# Patient Record
Sex: FEMALE | Race: WHITE | NOT HISPANIC OR LATINO | Employment: UNEMPLOYED | URBAN - METROPOLITAN AREA
[De-identification: names, ages, dates, MRNs, and addresses within clinical notes are randomized per-mention and may not be internally consistent; named-entity substitution may affect disease eponyms.]

---

## 2018-04-09 ENCOUNTER — APPOINTMENT (OUTPATIENT)
Dept: CT IMAGING | Facility: CLINIC | Age: 58
End: 2018-04-09
Attending: INTERNAL MEDICINE
Payer: COMMERCIAL

## 2018-04-09 ENCOUNTER — HOSPITAL ENCOUNTER (EMERGENCY)
Facility: CLINIC | Age: 58
Discharge: HOME | End: 2018-04-09
Attending: INTERNAL MEDICINE | Admitting: INTERNAL MEDICINE
Payer: COMMERCIAL

## 2018-04-09 VITALS
SYSTOLIC BLOOD PRESSURE: 122 MMHG | OXYGEN SATURATION: 94 % | HEART RATE: 96 BPM | HEIGHT: 65 IN | TEMPERATURE: 97.6 F | DIASTOLIC BLOOD PRESSURE: 67 MMHG | BODY MASS INDEX: 27.32 KG/M2 | RESPIRATION RATE: 18 BRPM | WEIGHT: 164 LBS

## 2018-04-09 DIAGNOSIS — R79.89 PRERENAL AZOTEMIA: ICD-10-CM

## 2018-04-09 DIAGNOSIS — I95.1 ORTHOSTATIC HYPOTENSION: ICD-10-CM

## 2018-04-09 LAB
ALBUMIN SERPL-MCNC: 3.5 G/DL (ref 3.4–5)
ALBUMIN UR-MCNC: NEGATIVE MG/DL
ALP SERPL-CCNC: 391 U/L (ref 40–150)
ALT SERPL W P-5'-P-CCNC: 105 U/L (ref 0–50)
ANION GAP SERPL CALCULATED.3IONS-SCNC: 11 MMOL/L (ref 3–14)
APPEARANCE UR: CLEAR
AST SERPL W P-5'-P-CCNC: 62 U/L (ref 0–45)
BACTERIA #/AREA URNS HPF: ABNORMAL /HPF
BASOPHILS # BLD AUTO: 0.1 10E9/L (ref 0–0.2)
BASOPHILS NFR BLD AUTO: 1.1 %
BILIRUB SERPL-MCNC: 1 MG/DL (ref 0.2–1.3)
BILIRUB UR QL STRIP: NEGATIVE
BUN SERPL-MCNC: 35 MG/DL (ref 7–30)
CALCIUM SERPL-MCNC: 9.1 MG/DL (ref 8.5–10.1)
CHLORIDE SERPL-SCNC: 90 MMOL/L (ref 94–109)
CO2 SERPL-SCNC: 24 MMOL/L (ref 20–32)
COLOR UR AUTO: ABNORMAL
CREAT BLD-MCNC: 2.2 MG/DL (ref 0.52–1.04)
CREAT SERPL-MCNC: 2 MG/DL (ref 0.52–1.04)
DIFFERENTIAL METHOD BLD: ABNORMAL
EOSINOPHIL # BLD AUTO: 0.2 10E9/L (ref 0–0.7)
EOSINOPHIL NFR BLD AUTO: 3.2 %
ERYTHROCYTE [DISTWIDTH] IN BLOOD BY AUTOMATED COUNT: 12.9 % (ref 10–15)
GFR SERPL CREATININE-BSD FRML MDRD: 23 ML/MIN/1.7M2
GFR SERPL CREATININE-BSD FRML MDRD: 25 ML/MIN/1.7M2
GLUCOSE SERPL-MCNC: 105 MG/DL (ref 70–99)
GLUCOSE UR STRIP-MCNC: NEGATIVE MG/DL
HCT VFR BLD AUTO: 29.9 % (ref 35–47)
HGB BLD-MCNC: 9.9 G/DL (ref 11.7–15.7)
HGB UR QL STRIP: NEGATIVE
IMM GRANULOCYTES # BLD: 0.1 10E9/L (ref 0–0.4)
IMM GRANULOCYTES NFR BLD: 1.5 %
INTERPRETATION ECG - MUSE: NORMAL
KETONES UR STRIP-MCNC: NEGATIVE MG/DL
LACTATE BLD-SCNC: 0.9 MMOL/L (ref 0.7–2)
LEUKOCYTE ESTERASE UR QL STRIP: NEGATIVE
LYMPHOCYTES # BLD AUTO: 1 10E9/L (ref 0.8–5.3)
LYMPHOCYTES NFR BLD AUTO: 12.8 %
MCH RBC QN AUTO: 29.9 PG (ref 26.5–33)
MCHC RBC AUTO-ENTMCNC: 33.1 G/DL (ref 31.5–36.5)
MCV RBC AUTO: 90 FL (ref 78–100)
MONOCYTES # BLD AUTO: 0.8 10E9/L (ref 0–1.3)
MONOCYTES NFR BLD AUTO: 10.3 %
NEUTROPHILS # BLD AUTO: 5.4 10E9/L (ref 1.6–8.3)
NEUTROPHILS NFR BLD AUTO: 71.1 %
NITRATE UR QL: NEGATIVE
NRBC # BLD AUTO: 0 10*3/UL
NRBC BLD AUTO-RTO: 0 /100
PH UR STRIP: 6 PH (ref 5–7)
PLATELET # BLD AUTO: 610 10E9/L (ref 150–450)
POTASSIUM SERPL-SCNC: 4.4 MMOL/L (ref 3.4–5.3)
PROT SERPL-MCNC: 7.7 G/DL (ref 6.8–8.8)
RBC # BLD AUTO: 3.31 10E12/L (ref 3.8–5.2)
RBC #/AREA URNS AUTO: <1 /HPF (ref 0–2)
SODIUM SERPL-SCNC: 125 MMOL/L (ref 133–144)
SOURCE: ABNORMAL
SP GR UR STRIP: 1 (ref 1–1.03)
SQUAMOUS #/AREA URNS AUTO: 1 /HPF (ref 0–1)
TROPONIN I SERPL-MCNC: <0.015 UG/L (ref 0–0.04)
UROBILINOGEN UR STRIP-MCNC: 0 MG/DL (ref 0–2)
WBC # BLD AUTO: 7.6 10E9/L (ref 4–11)
WBC #/AREA URNS AUTO: <1 /HPF (ref 0–5)

## 2018-04-09 PROCEDURE — 96361 HYDRATE IV INFUSION ADD-ON: CPT

## 2018-04-09 PROCEDURE — 36415 COLL VENOUS BLD VENIPUNCTURE: CPT | Performed by: INTERNAL MEDICINE

## 2018-04-09 PROCEDURE — 96360 HYDRATION IV INFUSION INIT: CPT

## 2018-04-09 PROCEDURE — 80053 COMPREHEN METABOLIC PANEL: CPT | Performed by: INTERNAL MEDICINE

## 2018-04-09 PROCEDURE — 84484 ASSAY OF TROPONIN QUANT: CPT | Performed by: INTERNAL MEDICINE

## 2018-04-09 PROCEDURE — 81001 URINALYSIS AUTO W/SCOPE: CPT | Performed by: INTERNAL MEDICINE

## 2018-04-09 PROCEDURE — 25000128 H RX IP 250 OP 636: Performed by: INTERNAL MEDICINE

## 2018-04-09 PROCEDURE — 74176 CT ABD & PELVIS W/O CONTRAST: CPT

## 2018-04-09 PROCEDURE — 93005 ELECTROCARDIOGRAM TRACING: CPT

## 2018-04-09 PROCEDURE — 85025 COMPLETE CBC W/AUTO DIFF WBC: CPT | Performed by: INTERNAL MEDICINE

## 2018-04-09 PROCEDURE — 83605 ASSAY OF LACTIC ACID: CPT | Performed by: INTERNAL MEDICINE

## 2018-04-09 PROCEDURE — 82565 ASSAY OF CREATININE: CPT | Mod: 91

## 2018-04-09 PROCEDURE — 99285 EMERGENCY DEPT VISIT HI MDM: CPT | Mod: 25

## 2018-04-09 RX ORDER — POLYETHYLENE GLYCOL 3350 17 G/17G
1 POWDER, FOR SOLUTION ORAL DAILY
COMMUNITY
End: 2020-10-07

## 2018-04-09 RX ORDER — SODIUM CHLORIDE 9 MG/ML
1000 INJECTION, SOLUTION INTRAVENOUS CONTINUOUS
Status: DISCONTINUED | OUTPATIENT
Start: 2018-04-09 | End: 2018-04-09

## 2018-04-09 RX ADMIN — SODIUM CHLORIDE 1000 ML: 9 INJECTION, SOLUTION INTRAVENOUS at 11:40

## 2018-04-09 ASSESSMENT — ENCOUNTER SYMPTOMS
DIAPHORESIS: 1
ABDOMINAL PAIN: 0
FEVER: 0
APPETITE CHANGE: 1
COUGH: 0
DECREASED CONCENTRATION: 1
WEAKNESS: 1

## 2018-04-09 NOTE — ED NOTES
Patient presents to ER for generalized weakness with syncope/fainting starting Thursday. Patient just discharged from [facility] on Wednesday after bypass surgery on March 21st. ABC's intact.

## 2018-04-09 NOTE — DISCHARGE INSTRUCTIONS
Start eating, even if you have to force yourself  For now, do not take hydrochlorothiazide, and do not take Norvasc (amlodipine)  See Dr. Goel on THursday. Call her office for a time    Discharge Instructions for Low Blood Pressure (Hypotension)  You have been diagnosed with hypotension. When you have hypotension, your blood pressure is lower than normal. Low blood pressure can make you feel dizzy or faint. This condition is sometimes a side effect of taking certain medicines, including medicines for high blood pressure (hypertension). It can also result from medical conditions such as dehydration.  Home care  These home care steps can help manage your condition:    Follow your doctor s instructions.    Rest in bed and ask for help with daily activities until you feel better. You may need to slowly increase the amount of time you spend sitting or doing light activity.    Don t drive while your blood pressure is not controlled.    Be careful when you get up from sitting or lying down.    Take your time. Sudden movements can cause dizziness or fainting.    When you first sit up after lying down, be sure to sit in bed for 30 seconds or so before getting up to walk.    Tell your doctor about the medicines you are taking. Many kinds of medicines trigger low blood pressure.    Limit your alcohol intake to no more than 2 drinks a day for men and 1 drink a day for women. Alcohol can dehydrate you even further. It can also interfere with the effectiveness of medicines.    Prevent dehydration by drinking plenty of fluids, unless otherwise instructed by your doctor    Learn to take your own blood pressure. Keep a record of your results. Ask your doctor which readings mean that you need medical attention.    Tell your family members to call an ambulance if you become unconscious. Request that they learn CPR.  Follow-up care  Make a follow-up appointment as directed by our staff.     When to seek medical care  Call your  doctor immediately if you have any of the following:    Chest pain or shortness of breath (call 911)    Dizziness or fainting spells    Black, maroon, or tarry stools    Irregular heartbeat    Stiff neck    Severe upper back pain    Diarrhea or vomiting that doesn t go away    Inability to eat or drink    Burning sensation when you urinate    Urine with a strong, unpleasant odor    Fainting with exercise   Date Last Reviewed: 4/27/2016 2000-2017 The "[employer]. [address] [place] [address]. All rights reserved. This information is not intended as a substitute for professional medical care. Always follow your healthcare professional's instructions.

## 2018-04-09 NOTE — ED AVS SNAPSHOT
[facility] Emergency Department    [address]    [facility] [address]    Phone:  [phone]    Fax:  [phone]                                       Isa Eller   MRN: [id number]    Department:  [facility] Emergency Department   Date of Visit:  4/9/2018           Patient Information     Date Of Birth          1960        Your diagnoses for this visit were:     Orthostatic hypotension     Prerenal azotemia        You were seen by Nyla Levi MD.      Follow-up Information     Follow up with Anibal Goel In 3 days.    Contact information:    [facility]  [address] [facility] [id number] [phone]          Discharge Instructions         Start eating, even if you have to force yourself  For now, do not take hydrochlorothiazide, and do not take Norvasc (amlodipine)  See Dr. Goel on THursday. Call her office for a time    Discharge Instructions for Low Blood Pressure (Hypotension)  You have been diagnosed with hypotension. When you have hypotension, your blood pressure is lower than normal. Low blood pressure can make you feel dizzy or faint. This condition is sometimes a side effect of taking certain medicines, including medicines for high blood pressure (hypertension). It can also result from medical conditions such as dehydration.  Home care  These home care steps can help manage your condition:    Follow your doctor s instructions.    Rest in bed and ask for help with daily activities until you feel better. You may need to slowly increase the amount of time you spend sitting or doing light activity.    Don t drive while your blood pressure is not controlled.    Be careful when you get up from sitting or lying down.    Take your time. Sudden movements can cause dizziness or fainting.    When you first sit up after lying down, be sure to sit in bed for 30 seconds or so before getting up to walk.    Tell your doctor about the  medicines you are taking. Many kinds of medicines trigger low blood pressure.    Limit your alcohol intake to no more than 2 drinks a day for men and 1 drink a day for women. Alcohol can dehydrate you even further. It can also interfere with the effectiveness of medicines.    Prevent dehydration by drinking plenty of fluids, unless otherwise instructed by your doctor    Learn to take your own blood pressure. Keep a record of your results. Ask your doctor which readings mean that you need medical attention.    Tell your family members to call an ambulance if you become unconscious. Request that they learn CPR.  Follow-up care  Make a follow-up appointment as directed by our staff.     When to seek medical care  Call your doctor immediately if you have any of the following:    Chest pain or shortness of breath (call 911)    Dizziness or fainting spells    Black, maroon, or tarry stools    Irregular heartbeat    Stiff neck    Severe upper back pain    Diarrhea or vomiting that doesn t go away    Inability to eat or drink    Burning sensation when you urinate    Urine with a strong, unpleasant odor    Fainting with exercise   Date Last Reviewed: 4/27/2016 2000-2017 [employer]. [address] [place] [address]. All rights reserved. This information is not intended as a substitute for professional medical care. Always follow your healthcare professional's instructions.          24 Hour Appointment Hotline       To make an appointment at any [facility], call [phone] ([phone]). If you don't have a family doctor or clinic, we will help you find one. [facility] are conveniently located to serve the needs of you and your family.             Review of your medicines      Our records show that you are taking the medicines listed below. If these are incorrect, please call your family doctor or clinic.        Dose / Directions Last dose taken    AMLODIPINE BESYLATE PO   Dose:  10  mg        Take 10 mg by mouth daily   Refills:  0        ASPIRIN EC PO   Dose:  81 mg        Take 81 mg by mouth daily   Refills:  0        calcium carbonate 500 MG chewable tablet   Commonly known as:  TUMS   Dose:  3 chew tab        Take 3 chew tab by mouth 2 times daily as needed for heartburn   Refills:  0        clonazePAM 1 MG tablet   Commonly known as:  klonoPIN   Dose:  1 mg        Take 1 mg by mouth daily as needed for anxiety   Refills:  0        CYCLOBENZAPRINE HCL PO   Dose:  10 mg        Take 10 mg by mouth   Refills:  0        HYDROCHLOROTHIAZIDE PO   Dose:  12.5 mg        Take 12.5 mg by mouth daily   Refills:  0        LIPITOR 80 MG tablet   Dose:  80 mg   Generic drug:  atorvastatin        Take 80 mg by mouth daily   Refills:  0        LISINOPRIL PO   Dose:  40 mg        Take 40 mg by mouth   Refills:  0        omeprazole 40 MG capsule   Commonly known as:  priLOSEC   Dose:  40 mg   Quantity:  90 capsule        Take 1 capsule (40 mg) by mouth daily Take 30-60 minutes before a meal.   Refills:  1        polyethylene glycol Packet   Commonly known as:  MIRALAX/GLYCOLAX   Dose:  1 packet        Take 1 packet by mouth daily   Refills:  0        TOPROL XL PO   Dose:  100 mg        Take 100 mg by mouth daily   Refills:  0        VITAMIN D (CHOLECALCIFEROL) PO   Dose:  58973 Units        Take 50,000 Units by mouth twice a week On Sunday and Wednesday.   Refills:  0        ZOFRAN PO   Dose:  4 mg        Take 4 mg by mouth   Refills:  0                Procedures and tests performed during your visit     CBC with platelets differential    CT Abdomen Pelvis w/o Contrast    Comprehensive metabolic panel    Creatinine POCT    EKG 12-lead, tracing only    Lactic acid whole blood    Troponin I    UA with Microscopic reflex to Culture      Orders Needing Specimen Collection     None      Pending Results     No orders found from 4/7/2018 to 4/10/2018.            Pending Culture Results     No orders found from  4/7/2018 to 4/10/2018.            Pending Results Instructions     If you had any lab results that were not finalized at the time of your Discharge, you can call the ED Lab Result RN at [phone]. You will be contacted by this team for any positive Lab results or changes in treatment. The nurses are available 7 days a week from 10A to 6:30P.  You can leave a message 24 hours per day and they will return your call.        Test Results From Your Hospital Stay        4/9/2018 12:19 PM      Component Results     Component Value Ref Range & Units Status    WBC 7.6 4.0 - 11.0 10e9/L Final    RBC Count 3.31 (L) 3.8 - 5.2 10e12/L Final    Hemoglobin 9.9 (L) 11.7 - 15.7 g/dL Final    Hematocrit 29.9 (L) 35.0 - 47.0 % Final    MCV 90 78 - 100 fl Final    MCH 29.9 26.5 - 33.0 pg Final    MCHC 33.1 31.5 - 36.5 g/dL Final    RDW 12.9 10.0 - 15.0 % Final    Platelet Count 610 (H) 150 - 450 10e9/L Final    Diff Method Automated Method  Final    % Neutrophils 71.1 % Final    % Lymphocytes 12.8 % Final    % Monocytes 10.3 % Final    % Eosinophils 3.2 % Final    % Basophils 1.1 % Final    % Immature Granulocytes 1.5 % Final    Nucleated RBCs 0 0 /100 Final    Absolute Neutrophil 5.4 1.6 - 8.3 10e9/L Final    Absolute Lymphocytes 1.0 0.8 - 5.3 10e9/L Final    Absolute Monocytes 0.8 0.0 - 1.3 10e9/L Final    Absolute Eosinophils 0.2 0.0 - 0.7 10e9/L Final    Absolute Basophils 0.1 0.0 - 0.2 10e9/L Final    Abs Immature Granulocytes 0.1 0 - 0.4 10e9/L Final    Absolute Nucleated RBC 0.0  Final         4/9/2018 12:48 PM      Component Results     Component Value Ref Range & Units Status    Sodium 125 (L) 133 - 144 mmol/L Final    Potassium 4.4 3.4 - 5.3 mmol/L Final    Chloride 90 (L) 94 - 109 mmol/L Final    Carbon Dioxide 24 20 - 32 mmol/L Final    Anion Gap 11 3 - 14 mmol/L Final    Glucose 105 (H) 70 - 99 mg/dL Final    Urea Nitrogen 35 (H) 7 - 30 mg/dL Final    Creatinine 2.00 (H) 0.52 - 1.04 mg/dL Final    GFR Estimate 25 (L)  >60 mL/min/1.7m2 Final    Non  GFR Calc    GFR Estimate If Black 31 (L) >60 mL/min/1.7m2 Final    African American GFR Calc    Calcium 9.1 8.5 - 10.1 mg/dL Final    Bilirubin Total 1.0 0.2 - 1.3 mg/dL Final    Albumin 3.5 3.4 - 5.0 g/dL Final    Protein Total 7.7 6.8 - 8.8 g/dL Final    Alkaline Phosphatase 391 (H) 40 - 150 U/L Final     (H) 0 - 50 U/L Final    AST 62 (H) 0 - 45 U/L Final         4/9/2018 12:48 PM      Component Results     Component Value Ref Range & Units Status    Troponin I ES <0.015 0.000 - 0.045 ug/L Final    The 99th percentile for upper reference range is 0.045 ug/L.  Troponin values   in the range of 0.045 - 0.120 ug/L may be associated with risks of adverse   clinical events.           4/9/2018  1:46 PM      Component Results     Component Value Ref Range & Units Status    Color Urine Straw  Final    Appearance Urine Clear  Final    Glucose Urine Negative NEG^Negative mg/dL Final    Bilirubin Urine Negative NEG^Negative Final    Ketones Urine Negative NEG^Negative mg/dL Final    Specific Gravity Urine 1.004 1.003 - 1.035 Final    Blood Urine Negative NEG^Negative Final    pH Urine 6.0 5.0 - 7.0 pH Final    Protein Albumin Urine Negative NEG^Negative mg/dL Final    Urobilinogen mg/dL 0.0 0.0 - 2.0 mg/dL Final    Nitrite Urine Negative NEG^Negative Final    Leukocyte Esterase Urine Negative NEG^Negative Final    Source Midstream Urine  Final    WBC Urine <1 0 - 5 /HPF Final    RBC Urine <1 0 - 2 /HPF Final    Bacteria Urine Few (A) NEG^Negative /HPF Final    Squamous Epithelial /HPF Urine 1 0 - 1 /HPF Final         4/9/2018 11:49 AM      Component Results     Component Value Ref Range & Units Status    Creatinine 2.2 (H) 0.52 - 1.04 mg/dL Final    GFR Estimate 23 (L) >60 mL/min/1.7m2 Final    GFR Estimate If Black 28 (L) >60 mL/min/1.7m2 Final         4/9/2018 12:54 PM      Narrative     CT ABDOMEN/PELVIS WITHOUT CONTRAST April 9, 2018 12:26 PM     HISTORY: Recent  aorto-bifemoral bypass, now hypotension and abdomen  tenderness.      TECHNIQUE: Axial images are obtained from the lung bases to the  symphysis without oral or IV contrast. Coronal reformatted images are  also generated.  Radiation dose for this scan was reduced using  automated exposure control, adjustment of the mA and/or kV according  to patient size, or iterative reconstruction technique.    FINDINGS: Mild dependent atelectasis is present at the lung bases.  Lung bases are otherwise clear.    Abdomen: Allowing for the noncontrast technique, the liver, spleen,  pancreas and adrenal glands are unremarkable. No urinary tract calculi  or hydronephrosis. Prior cholecystectomy. Patient is status post  aortobifemoral graft placement. Aortobiiliac graft is present.  Previously noted distal aortic and common iliac stents are also noted.  Appendix is normal.    Pelvis: Calcified uterine fibroids are present. The bladder and rectum  are unremarkable. No enlarged pelvic lymph nodes or free fluid. Bone  window examination is unremarkable.        Impression     IMPRESSION:  1. Status post aortobiiliac graft placement. Postoperative  inflammation is noted in the retroperitoneum but no evidence of  retroperitoneal hemorrhage or lymph node enlargement.  2. No evidence of bowel obstruction or diverticulitis. Appendix is  normal. No evidence of retroperitoneal hemorrhage. Scattered calcified  plaque is noted in the upper abdominal aorta. No enlarged abdominal  lymph nodes.      SAMMY KIM MD         4/9/2018  1:31 PM      Component Results     Component Value Ref Range & Units Status    Lactic Acid 0.9 0.7 - 2.0 mmol/L Final                Clinical Quality Measure: Blood Pressure Screening     Your blood pressure was checked while you were in the emergency department today. The last reading we obtained was  BP: 115/56 . Please read the guidelines below about what these numbers mean and what you should do about them.  If your  "systolic blood pressure (the top number) is less than 120 and your diastolic blood pressure (the bottom number) is less than 80, then your blood pressure is normal. There is nothing more that you need to do about it.  If your systolic blood pressure (the top number) is 120-139 or your diastolic blood pressure (the bottom number) is 80-89, your blood pressure may be higher than it should be. You should have your blood pressure rechecked within a year by a primary care provider.  If your systolic blood pressure (the top number) is 140 or greater or your diastolic blood pressure (the bottom number) is 90 or greater, you may have high blood pressure. High blood pressure is treatable, but if left untreated over time it can put you at risk for heart attack, stroke, or kidney failure. You should have your blood pressure rechecked by a primary care provider within the next 4 weeks.  If your provider in the emergency department today gave you specific instructions to follow-up with your doctor or provider even sooner than that, you should follow that instruction and not wait for up to 4 weeks for your follow-up visit.        Thank you for choosing [facility]       Thank you for choosing [facility] for your care. Our goal is always to provide you with excellent care. Hearing back from our patients is one way we can continue to improve our services. Please take a few minutes to complete the written survey that you may receive in the mail after you visit with us. Thank you!        [employer] Information     [employer] lets you send messages to your doctor, view your test results, renew your prescriptions, schedule appointments and more. To sign up, go to [email or web address] . Click on \"Log in\" on the left side of the screen, which will take you to the Welcome page. Then click on \"Sign up Now\" on the right side of the page.     You will be asked to enter the access code listed below, as well as some personal information. Please " follow the directions to create your username and password.     Your access code is: [email or web address]  Expires: 2018  3:04 PM     Your access code will  in 90 days. If you need help or a new code, please call your [facility] clinic or [phone].        Care EveryWhere ID     This is your Care EveryWhere ID. This could be used by other organizations to access your [facility] medical records  [id number]        Equal Access to Services     IVANIA VAUGHN : Hadii demi steward hadasho Soomaali, waaxda luqadaha, qaybta kaalmada adeegyajo, franco hardin . So [facility] [phone].    ATENCIÓN: Si habla español, tiene a santos disposición servicios gratuitos de asistencia lingüística. Llame al [phone].    We comply with applicable federal civil rights laws and [place] laws. We do not discriminate on the basis of race, color, national origin, age, disability, sex, sexual orientation, or gender identity.            After Visit Summary       This is your record. Keep this with you and show to your community pharmacist(s) and doctor(s) at your next visit.

## 2018-04-09 NOTE — ED PROVIDER NOTES
History     Chief Complaint:  Generalized Weakness    HPI   Isa Eller is a 57 year old female, with a history of PAD, who presents with her sisters to the ED for evaluation of generalized weakness. The patient reports she had an aorta bifemoral bypass surgery on 3/21/18 at [facility]. She felt well to be discharged and was discharged on 4/4/18. The patient notes she felt weak the next day and has had near syncopal episodes. She has diaphoresis, blurry vision, and has been falling into walls. The patient's sister reports she has had difficulty concentrating since discharge as well. She did have nausea during her hospital stay and after discharge which has resolved with anti-nausea medication 3 days ago. The patient reports she has no appetite. She has been hydrating. The patient denies any loss of consciousness, fevers, cough, cold symptoms, chest pain, or abdominal pain.     Laboratory, 4/2/2018  Creatinine: 0.65  HGB: 8.9     Allergies:  No known drug allergies    Medications:    Amlodipine besylate  Cyclobenzaprine  Hydrochlorothiazide  Lisinopril   Zofran  Miralax/Glycolax  Prilosec  Clonazepam  Vitamin D  Aspirin  Metoprolol succinate  Lipitor  Tums     Past Medical History:    Acalculous cholecystitis  Anxiety  Clotting disorder  HTN  HLD  PAD     Past Surgical History:    Breast surgery  Aortic bifemoral bypass  Cholecystectomy    Family History:    History reviewed. No pertinent family history.     Social History:  Smoking status: Current every day smoker  Alcohol use: Yes  Presents to ED with sisters   Marital Status:  Single [1]     Review of Systems   Constitutional: Positive for appetite change and diaphoresis. Negative for fever.   Eyes: Positive for visual disturbance.   Respiratory: Negative for cough.    Cardiovascular: Negative for chest pain.   Gastrointestinal: Negative for abdominal pain.   Neurological: Positive for weakness. Negative for syncope.   Psychiatric/Behavioral: Positive for  "decreased concentration.   All other systems reviewed and are negative.    Physical Exam     Patient Vitals for the past 24 hrs:   BP Temp Temp src Pulse Heart Rate Resp SpO2 Height Weight   04/09/18 1500 122/67 - - - 89 18 94 % - -   04/09/18 1445 115/56 - - - 85 18 92 % - -   04/09/18 1430 121/63 - - - 86 15 92 % - -   04/09/18 1415 113/59 - - - 85 15 92 % - -   04/09/18 1400 113/60 - - - 90 15 96 % - -   04/09/18 1345 116/73 - - - 86 18 96 % - -   04/09/18 1300 113/67 - - - 82 25 94 % - -   04/09/18 1245 112/62 - - - - - - - -   04/09/18 1200 111/58 - - - 84 12 95 % - -   04/09/18 1145 113/53 - - - 87 19 93 % - -   04/09/18 1128 - 97.6  F (36.4  C) Oral - - - - - -   04/09/18 1119 99/60 - - 96 - 22 95 % 1.651 m (5' 5\") 74.4 kg (164 lb)     Physical Exam   Constitutional: She is cooperative.   HENT:   Right Ear: Tympanic membrane normal.   Left Ear: Tympanic membrane normal.   Mouth/Throat: Oropharynx is clear and moist and mucous membranes are normal.   Eyes: Conjunctivae are normal.   Neck: Normal range of motion.   Cardiovascular: Regular rhythm and normal heart sounds.    Pulmonary/Chest: Effort normal and breath sounds normal.   Abdominal: Soft. Normal appearance and bowel sounds are normal. There is tenderness.   Well-healed recent midline incision   Musculoskeletal: Normal range of motion.   Lymphadenopathy:     She has no cervical adenopathy.   Neurological: She is alert.   Skin: Skin is warm and dry. Bruising noted.   Multiple bruises, patient explains due to falls   Psychiatric: She has a normal mood and affect.     Emergency Department Course     ECG (11:43:03):  Rate 89 bpm. CA interval 164. QRS duration 80. QT/QTc 378/459. P-R-T axes 29 26 32. Normal sinus rhythm. Nonspecific ST abnormality. Abnormal ECG. Compared with 7/20/16, ST/T wave abnormalities have improved or resolved. Interpreted at 1225 by Nyla Levi MD.    Imaging:  Radiographic findings were communicated with the patient and " family who voiced understanding of the findings.    CT Abdomen Pelvis w/o Contrast  IMPRESSION:  1. Status post aortobiiliac graft placement. Postoperative  inflammation is noted in the retroperitoneum but no evidence of  retroperitoneal hemorrhage or lymph node enlargement.  2. No evidence of bowel obstruction or diverticulitis. Appendix is  normal. No evidence of retroperitoneal hemorrhage. Scattered calcified  plaque is noted in the upper abdominal aorta. No enlarged abdominal  lymph nodes.  As read by Radiology.     Laboratory:  Creatinine POCT: Creatinine 2.2(H), GFR estimate 23(L)  Troponin I (1135): <0.015  UA: Bacteria few   Lactic acid (1331): 0.9  CBC: HGB 9.9(L), (H), o/w WNL (WBC 7.6)  CMP: (L), Chloride 90(L), Glucose 105(H), BUN 35(H), GFR estimate 25(L), Alkphos 391(H), Creatinine 2.00(H)    Interventions:  1140: NS 1L Bolus IV    Emergency Department Course:  Past medical records, nursing notes, and vitals reviewed.  1119: I performed an exam of the patient and obtained history, as documented above.    IV inserted and blood drawn.    The patient was sent for an abdomen pelvis CT while in the emergency department, findings above.    1305: I rechecked the patient. Explained findings to patient and sisters.    1340: I spoke to Dr. Cash of the hospitalist service.    1420: I rechecked the patient.    1439: I consulted Dr. Goel, patient's vascular surgeon.     1456: I rechecked the patient. Explained plan to patient and sisters.    Findings and plan explained to the Patient and sisters. Patient discharged home with instructions regarding supportive care, medications, and reasons to return. The importance of close follow-up was reviewed.     Impression & Plan      Medical Decision Making:  Isa Eller, 57 year old woman with recent aortobifemoral bypass presents now with recurrent lightheadedness and near syncope. Her symptoms have not really progressed over recent days. Here, she did  have significant orthostatic vital sign changes on arrival. With IV fluid, she's now able to stand without symptoms and orthostatic vital sign changes have resolved. EKG does not show any acute abnormality. Hemoglobin is low but improved from discharge at [facility]. She does have a significantly elevated creatinine here which is new since discharge. With her history, BUN and creatinine ratio, and orthostatic vital sign changes, I suspect prerenal azotemia. She does have mild hyponatremia. I discussed the case with Dr. Goel, the patient's vascular surgeon. The patient herself prefers discharge home. Given her evidence of prerenal azotemia, we will stop Hydrochlorothiazide as well as Norvasc at least for now. Dr. Goel has offered to see the patient in close follow-up. I will have her push fluids. She indicates that she really hasn't eaten since before her surgery. She agrees that she will eat even though she's not hungry. She has only just started moving her bowels which may help her feel better. I've invited the patient to return for recurrent symptoms or other problems, follow-up in 3 days with Dr. Goel.     Diagnosis:    ICD-10-CM   1. Orthostatic hypotension I95.1   2. Prerenal azotemia R79.89     Disposition: Patient discharged to home with sisters     Destiny Goel  4/9/2018   [facility] EMERGENCY DEPARTMENT    I, Destiny Goel, am serving as a scribe at 11:19 AM on 4/9/2018 to document services personally performed by Nyla Levi MD based on my observations and the provider's statements to me.        Nyla Levi MD  04/09/18 [id number]

## 2018-04-09 NOTE — ED AVS SNAPSHOT
[facility] Emergency Department    [address]    [facility] [address]    Phone:  [phone]    Fax:  [phone]                                       Isa Eller   MRN: [id number]    Department:  [facility] Emergency Department   Date of Visit:  4/9/2018           After Visit Summary Signature Page     I have received my discharge instructions, and my questions have been answered. I have discussed any challenges I see with this plan with the nurse or doctor.    ..........................................................................................................................................  Patient/Patient Representative Signature      ..........................................................................................................................................  Patient Representative Print Name and Relationship to Patient    ..................................................               ................................................  Date                                            Time    ..........................................................................................................................................  Reviewed by Signature/Title    ...................................................              ..............................................  Date                                                            Time

## 2018-06-22 ENCOUNTER — HOSPITAL ENCOUNTER (EMERGENCY)
Facility: CLINIC | Age: 58
Discharge: HOME | End: 2018-06-23
Attending: EMERGENCY MEDICINE | Admitting: EMERGENCY MEDICINE
Payer: COMMERCIAL

## 2018-06-22 DIAGNOSIS — T50.901A ACCIDENTAL DRUG INGESTION, INITIAL ENCOUNTER: ICD-10-CM

## 2018-06-22 LAB
ANION GAP SERPL CALCULATED.3IONS-SCNC: 12 MMOL/L (ref 3–14)
APAP SERPL-MCNC: <2 MG/L (ref 10–20)
BASOPHILS # BLD AUTO: 0 10E9/L (ref 0–0.2)
BASOPHILS NFR BLD AUTO: 0.5 %
BUN SERPL-MCNC: 9 MG/DL (ref 7–30)
CALCIUM SERPL-MCNC: 8.3 MG/DL (ref 8.5–10.1)
CHLORIDE SERPL-SCNC: 100 MMOL/L (ref 94–109)
CO2 SERPL-SCNC: 22 MMOL/L (ref 20–32)
CREAT SERPL-MCNC: 0.81 MG/DL (ref 0.52–1.04)
DIFFERENTIAL METHOD BLD: NORMAL
EOSINOPHIL # BLD AUTO: 0 10E9/L (ref 0–0.7)
EOSINOPHIL NFR BLD AUTO: 0.2 %
ERYTHROCYTE [DISTWIDTH] IN BLOOD BY AUTOMATED COUNT: 13.2 % (ref 10–15)
ETHANOL SERPL-MCNC: 0.15 G/DL
GFR SERPL CREATININE-BSD FRML MDRD: 72 ML/MIN/1.7M2
GLUCOSE SERPL-MCNC: 111 MG/DL (ref 70–99)
HCT VFR BLD AUTO: 36.2 % (ref 35–47)
HGB BLD-MCNC: 12.1 G/DL (ref 11.7–15.7)
IMM GRANULOCYTES # BLD: 0 10E9/L (ref 0–0.4)
IMM GRANULOCYTES NFR BLD: 0.7 %
LYMPHOCYTES # BLD AUTO: 1 10E9/L (ref 0.8–5.3)
LYMPHOCYTES NFR BLD AUTO: 17.8 %
MCH RBC QN AUTO: 28.9 PG (ref 26.5–33)
MCHC RBC AUTO-ENTMCNC: 33.4 G/DL (ref 31.5–36.5)
MCV RBC AUTO: 86 FL (ref 78–100)
MONOCYTES # BLD AUTO: 0.5 10E9/L (ref 0–1.3)
MONOCYTES NFR BLD AUTO: 9.2 %
NEUTROPHILS # BLD AUTO: 4.1 10E9/L (ref 1.6–8.3)
NEUTROPHILS NFR BLD AUTO: 71.6 %
NRBC # BLD AUTO: 0 10*3/UL
NRBC BLD AUTO-RTO: 0 /100
PLATELET # BLD AUTO: 232 10E9/L (ref 150–450)
POTASSIUM SERPL-SCNC: 3.6 MMOL/L (ref 3.4–5.3)
RBC # BLD AUTO: 4.19 10E12/L (ref 3.8–5.2)
SODIUM SERPL-SCNC: 134 MMOL/L (ref 133–144)
WBC # BLD AUTO: 5.7 10E9/L (ref 4–11)

## 2018-06-22 PROCEDURE — 80329 ANALGESICS NON-OPIOID 1 OR 2: CPT | Performed by: EMERGENCY MEDICINE

## 2018-06-22 PROCEDURE — 96360 HYDRATION IV INFUSION INIT: CPT

## 2018-06-22 PROCEDURE — 85025 COMPLETE CBC W/AUTO DIFF WBC: CPT | Performed by: EMERGENCY MEDICINE

## 2018-06-22 PROCEDURE — 96361 HYDRATE IV INFUSION ADD-ON: CPT

## 2018-06-22 PROCEDURE — 25000128 H RX IP 250 OP 636: Performed by: EMERGENCY MEDICINE

## 2018-06-22 PROCEDURE — 93005 ELECTROCARDIOGRAM TRACING: CPT

## 2018-06-22 PROCEDURE — 80320 DRUG SCREEN QUANTALCOHOLS: CPT | Performed by: EMERGENCY MEDICINE

## 2018-06-22 PROCEDURE — 80048 BASIC METABOLIC PNL TOTAL CA: CPT | Performed by: EMERGENCY MEDICINE

## 2018-06-22 PROCEDURE — 99284 EMERGENCY DEPT VISIT MOD MDM: CPT | Mod: 25

## 2018-06-22 RX ORDER — SODIUM CHLORIDE 9 MG/ML
1000 INJECTION, SOLUTION INTRAVENOUS CONTINUOUS
Status: DISCONTINUED | OUTPATIENT
Start: 2018-06-22 | End: 2018-06-23 | Stop reason: HOSPADM

## 2018-06-22 RX ADMIN — SODIUM CHLORIDE 1000 ML: 9 INJECTION, SOLUTION INTRAVENOUS at 21:51

## 2018-06-22 NOTE — ED AVS SNAPSHOT
[facility] Emergency Department    [address] [place] [address]    Phone:  [phone]    Fax:  [phone]                                       Isa Eller   MRN: [id number]    Department:  [facility] Emergency Department   Date of Visit:  6/22/2018           Patient Information     Date Of Birth          1960        Your diagnoses for this visit were:     Accidental drug ingestion, initial encounter        You were seen by Sherry Hillman MD and Yordan Nelson MD.      Follow-up Information     Follow up with Amena Stearns MD In 3 days.    Specialty:  Family Practice    Contact information:    [facility]  [address]  [facility] [address]  [phone]          Discharge Instructions         Accidental Ingestion: Nontoxic (Adult)  You have been evaluated and treated for accidentally taking too much of a medicine or swallowing a chemical product. There is no sign of toxic effect at this time. It's not likely that any new symptoms will appear. But, to be safe, watch for symptoms during the next 24 hours (see below). The symptoms will depend on what was swallowed.  Home care    If liquid charcoal was given to neutralize what was swallowed, Your stool may be black for 1 to 2 days. Usually, a laxative is given with charcoal. This speeds the removal of any toxins from the intestines. This may cause diarrhea for up to 24 hours.    If you have been given charcoal but no laxative, you may become constipated. If this occurs, you may take an over-the-counter laxative.  Prevention    Keep medicines, pesticides, and other household chemicals in their original containers.    Clearly willow all harmful products if a different bottle is used.    Keep all substances in a safe place.  In the future, if you or someone you know takes something possibly harmful, and you are not sure what to do, call the [employer] Po[employer] [facility]  [facility]. The phone number is [phone]. The phone line is staffed 24 hours a day. If you call, you will be connected to the poison control center closest to you.  Follow-up care  Follow up with your healthcare provider, or as advised.  Call 911  Call 911 if any of these occur.    Trouble breathing or swallowing, wheezing    Severe confusion    Extreme drowsiness or trouble awakening    Fainting or loss of consciousness    Rapid heart rate    Very slow heart rate    Very low or very high blood pressure    Vomiting blood, or large amounts of blood in stool    Seizure  When to seek medical advice  Call your healthcare provider right away if any of these occur.    Shakiness    Fast breathing (over 25 breaths per minute) or slow breathing (less than 8 breaths per minute)    Shortness of breath    Fever of 100.4 F (38 C) or higher, or as directed by your healthcare provider    Vomiting or diarrhea for more than 24 hours    Abdominal pain    Dizziness or weakness  Date Last Reviewed: 7/1/2017 2000-2017 [employer]. [address] [place] [address], [place] [address]. All rights reserved. This information is not intended as a substitute for professional medical care. Always follow your healthcare professional's instructions.          24 Hour Appointment Hotline       To make an appointment at any [facility], call [phone] ([phone]). If you don't have a family doctor or clinic, we will help you find one. [facility] are conveniently located to serve the needs of you and your family.             Review of your medicines      Our records show that you are taking the medicines listed below. If these are incorrect, please call your family doctor or clinic.        Dose / Directions Last dose taken    AMLODIPINE BESYLATE PO   Dose:  10 mg        Take 10 mg by mouth daily   Refills:  0        ASPIRIN EC PO   Dose:  81 mg        Take 81 mg by mouth daily   Refills:  0        calcium carbonate 500 MG  chewable tablet   Commonly known as:  TUMS   Dose:  3 chew tab        Take 3 chew tab by mouth 2 times daily as needed for heartburn   Refills:  0        clonazePAM 1 MG tablet   Commonly known as:  klonoPIN   Dose:  1 mg        Take 1 mg by mouth daily as needed for anxiety   Refills:  0        CYCLOBENZAPRINE HCL PO   Dose:  10 mg        Take 10 mg by mouth   Refills:  0        LIPITOR 80 MG tablet   Dose:  80 mg   Generic drug:  atorvastatin        Take 80 mg by mouth daily   Refills:  0        omeprazole 40 MG capsule   Commonly known as:  priLOSEC   Dose:  40 mg   Quantity:  90 capsule        Take 1 capsule (40 mg) by mouth daily Take 30-60 minutes before a meal.   Refills:  1        polyethylene glycol Packet   Commonly known as:  MIRALAX/GLYCOLAX   Dose:  1 packet        Take 1 packet by mouth daily   Refills:  0        TOPROL XL PO   Dose:  100 mg        Take 100 mg by mouth daily   Refills:  0        VITAMIN D (CHOLECALCIFEROL) PO   Dose:  73195 Units        Take 50,000 Units by mouth twice a week On Sunday and Wednesday.   Refills:  0        ZOFRAN PO   Dose:  4 mg        Take 4 mg by mouth   Refills:  0                Procedures and tests performed during your visit     Acetaminophen level    Alcohol level blood    Basic metabolic panel    CBC with platelets differential    EKG 12 lead      Orders Needing Specimen Collection     None      Pending Results     Date and Time Order Name Status Description    6/22/2018 2107 EKG 12 lead Preliminary             Pending Culture Results     No orders found for last 3 day(s).            Pending Results Instructions     If you had any lab results that were not finalized at the time of your Discharge, you can call the ED Lab Result RN at [phone]. You will be contacted by this team for any positive Lab results or changes in treatment. The nurses are available 7 days a week from 10A to 6:30P.  You can leave a message 24 hours per day and they will return your  call.        Test Results From Your Hospital Stay        6/22/2018  9:52 PM      Component Results     Component Value Ref Range & Units Status    WBC 5.7 4.0 - 11.0 10e9/L Final    RBC Count 4.19 3.8 - 5.2 10e12/L Final    Hemoglobin 12.1 11.7 - 15.7 g/dL Final    Hematocrit 36.2 35.0 - 47.0 % Final    MCV 86 78 - 100 fl Final    MCH 28.9 26.5 - 33.0 pg Final    MCHC 33.4 31.5 - 36.5 g/dL Final    RDW 13.2 10.0 - 15.0 % Final    Platelet Count 232 150 - 450 10e9/L Final    Diff Method Automated Method  Final    % Neutrophils 71.6 % Final    % Lymphocytes 17.8 % Final    % Monocytes 9.2 % Final    % Eosinophils 0.2 % Final    % Basophils 0.5 % Final    % Immature Granulocytes 0.7 % Final    Nucleated RBCs 0 0 /100 Final    Absolute Neutrophil 4.1 1.6 - 8.3 10e9/L Final    Absolute Lymphocytes 1.0 0.8 - 5.3 10e9/L Final    Absolute Monocytes 0.5 0.0 - 1.3 10e9/L Final    Absolute Eosinophils 0.0 0.0 - 0.7 10e9/L Final    Absolute Basophils 0.0 0.0 - 0.2 10e9/L Final    Abs Immature Granulocytes 0.0 0 - 0.4 10e9/L Final    Absolute Nucleated RBC 0.0  Final         6/22/2018 10:19 PM      Component Results     Component Value Ref Range & Units Status    Sodium 134 133 - 144 mmol/L Final    Reviewed, acceptable    Potassium 3.6 3.4 - 5.3 mmol/L Final    Chloride 100 94 - 109 mmol/L Final    Carbon Dioxide 22 20 - 32 mmol/L Final    Anion Gap 12 3 - 14 mmol/L Final    Glucose 111 (H) 70 - 99 mg/dL Final    Urea Nitrogen 9 7 - 30 mg/dL Final    Creatinine 0.81 0.52 - 1.04 mg/dL Final    GFR Estimate 72 >60 mL/min/1.7m2 Final    Non  GFR Calc    GFR Estimate If Black 87 >60 mL/min/1.7m2 Final    African American GFR Calc    Calcium 8.3 (L) 8.5 - 10.1 mg/dL Final         6/22/2018 10:35 PM      Component Results     Component Value Ref Range & Units Status    Acetaminophen Level <2 mg/L Final    Therapeutic range: 10-20 mg/L         6/22/2018 10:19 PM      Component Results     Component Value Ref Range &  Units Status    Ethanol g/dL 0.15 (H) <0.01 g/dL Final                Clinical Quality Measure: Blood Pressure Screening     Your blood pressure was checked while you were in the emergency department today. The last reading we obtained was  BP: 155/84 . Please read the guidelines below about what these numbers mean and what you should do about them.  If your systolic blood pressure (the top number) is less than 120 and your diastolic blood pressure (the bottom number) is less than 80, then your blood pressure is normal. There is nothing more that you need to do about it.  If your systolic blood pressure (the top number) is 120-139 or your diastolic blood pressure (the bottom number) is 80-89, your blood pressure may be higher than it should be. You should have your blood pressure rechecked within a year by a primary care provider.  If your systolic blood pressure (the top number) is 140 or greater or your diastolic blood pressure (the bottom number) is 90 or greater, you may have high blood pressure. High blood pressure is treatable, but if left untreated over time it can put you at risk for heart attack, stroke, or kidney failure. You should have your blood pressure rechecked by a primary care provider within the next 4 weeks.  If your provider in the emergency department today gave you specific instructions to follow-up with your doctor or provider even sooner than that, you should follow that instruction and not wait for up to 4 weeks for your follow-up visit.        Thank you for choosing [facility]       Thank you for choosing [facility] for your care. Our goal is always to provide you with excellent care. Hearing back from our patients is one way we can continue to improve our services. Please take a few minutes to complete the written survey that you may receive in the mail after you visit with us. Thank you!        [employer] Information     [employer] lets you send messages to your doctor, view your test results,  "renew your prescriptions, schedule appointments and more. To sign up, go to [email or web address] . Click on \"Log in\" on the left side of the screen, which will take you to the Welcome page. Then click on \"Sign up Now\" on the right side of the page.     You will be asked to enter the access code listed below, as well as some personal information. Please follow the directions to create your username and password.     Your access code is: [email or web address]  Expires: 2018  3:04 PM     Your access code will  in 90 days. If you need help or a new code, please call your [facility] clinic or [phone].        Care EveryWhere ID     This is your Care EveryWhere ID. This could be used by other organizations to access your [facility] medical records  [id number]        Equal Access to Services     VASHTI VAUGHN : Adelaide Huang, alex saunders, leslye kate, franco hardin . So [facility] [phone].    ATENCIÓN: Si habla español, tiene a santos disposición servicios gratuitos de asistencia lingüística. Llame al [phone].    We comply with applicable federal civil rights laws and [place] laws. We do not discriminate on the basis of race, color, national origin, age, disability, sex, sexual orientation, or gender identity.            After Visit Summary       This is your record. Keep this with you and show to your community pharmacist(s) and doctor(s) at your next visit.                  "

## 2018-06-22 NOTE — ED AVS SNAPSHOT
[facility] Emergency Department    [address]    [facility] [address]    Phone:  [phone]    Fax:  [phone]                                       Isa Eller   MRN: [id number]    Department:  [facility] Emergency Department   Date of Visit:  6/22/2018           After Visit Summary Signature Page     I have received my discharge instructions, and my questions have been answered. I have discussed any challenges I see with this plan with the nurse or doctor.    ..........................................................................................................................................  Patient/Patient Representative Signature      ..........................................................................................................................................  Patient Representative Print Name and Relationship to Patient    ..................................................               ................................................  Date                                            Time    ..........................................................................................................................................  Reviewed by Signature/Title    ...................................................              ..............................................  Date                                                            Time

## 2018-06-23 VITALS
DIASTOLIC BLOOD PRESSURE: 84 MMHG | HEART RATE: 133 BPM | RESPIRATION RATE: 22 BRPM | OXYGEN SATURATION: 92 % | SYSTOLIC BLOOD PRESSURE: 155 MMHG | TEMPERATURE: 97.8 F

## 2018-06-23 ASSESSMENT — ENCOUNTER SYMPTOMS
PALPITATIONS: 1
NERVOUS/ANXIOUS: 1
ACTIVITY CHANGE: 1
HYPERACTIVE: 1

## 2018-06-23 NOTE — DISCHARGE INSTRUCTIONS
Accidental Ingestion: Nontoxic (Adult)  You have been evaluated and treated for accidentally taking too much of a medicine or swallowing a chemical product. There is no sign of toxic effect at this time. It's not likely that any new symptoms will appear. But, to be safe, watch for symptoms during the next 24 hours (see below). The symptoms will depend on what was swallowed.  Home care    If liquid charcoal was given to neutralize what was swallowed, Your stool may be black for 1 to 2 days. Usually, a laxative is given with charcoal. This speeds the removal of any toxins from the intestines. This may cause diarrhea for up to 24 hours.    If you have been given charcoal but no laxative, you may become constipated. If this occurs, you may take an over-the-counter laxative.  Prevention    Keep medicines, pesticides, and other household chemicals in their original containers.    Clearly willow all harmful products if a different bottle is used.    Keep all substances in a safe place.  In the future, if you or someone you know takes something possibly harmful, and you are not sure what to do, call the [employer] of Poison Control Centers. The phone number is [phone]. The phone line is staffed 24 hours a day. If you call, you will be connected to the poison control center closest to you.  Follow-up care  Follow up with your healthcare provider, or as advised.  Call 911  Call 911 if any of these occur.    Trouble breathing or swallowing, wheezing    Severe confusion    Extreme drowsiness or trouble awakening    Fainting or loss of consciousness    Rapid heart rate    Very slow heart rate    Very low or very high blood pressure    Vomiting blood, or large amounts of blood in stool    Seizure  When to seek medical advice  Call your healthcare provider right away if any of these occur.    Shakiness    Fast breathing (over 25 breaths per minute) or slow breathing (less than 8 breaths per minute)    Shortness of  breath    Fever of 100.4 F (38 C) or higher, or as directed by your healthcare provider    Vomiting or diarrhea for more than 24 hours    Abdominal pain    Dizziness or weakness  Date Last Reviewed: 7/1/2017 2000-2017 [employer]. [address] [place] [address], [place] [address]. All rights reserved. This information is not intended as a substitute for professional medical care. Always follow your healthcare professional's instructions.

## 2018-06-23 NOTE — ED NOTES
ED VISIT ADDENDUM:    The care of this patient was signed out to me at shift change by Dr Hillman.  On recheck, the patient is alert, oriented, and mentating clearly.  She is not clinically intoxicated and she reports she is asymptomatic.  She is safe for discharge this time with primary care follow-up and strict return precautions.    Yordan Nelson MD  [facility]           Yordan Nelson MD  06/23/18 0008

## 2018-06-23 NOTE — ED PROVIDER NOTES
"  History     Chief Complaint:  Drug Overdose    HPI   Isa Eller is a 57 year old female with a history of hypertension, hyperlipidemia, clotting disorder, PAD, and anxiety who presents via EMS after consuming an unknown illicit drug. The patient reports she had been drinking today but is now unsure how much she drank. She notes she then found a \"marijuana pill\" that she got from someone two years ago, although she is unsure if it was really marijuana. She states she then became highly anxious and had palpitations, prompting her to call EMS for transport to the ED. She notes things are becoming more clear and she is remembering more of what happened, although she is unable to give a clear timeline of events. She denies chest pain or history of drug use.     Allergies:  No known drug allergies    Medications:    Amlodipine   Lipitor  Klonopin  Metoprolol  Aspirin  Prilosec    Past Medical History:    Anxiety  Clotting disorder  Hyperlipemia  Hypertension  PAD    Past Surgical History:    Breast surgery  Cardiac surgery  Cholecystectomy  Vascular surgery    Family History:    History reviewed. No pertinent family history.     Social History:  Marital Status:  Single [1]  Smoking status: Current every day smoker  Alcohol use: Yes    Review of Systems   Constitutional: Positive for activity change.   Cardiovascular: Positive for palpitations.   Psychiatric/Behavioral: The patient is nervous/anxious and is hyperactive.    All other systems reviewed and are negative.    Physical Exam     Patient Vitals for the past 24 hrs:   BP Temp Temp src Pulse Heart Rate Resp SpO2   06/22/18 2245 (!) 148/91 - - - - - 96 %   06/22/18 2230 (!) 158/99 - - - - - 96 %   06/22/18 2215 144/86 - - - - - 96 %   06/22/18 2200 113/79 - - - - - 93 %   06/22/18 2145 119/83 - - - - - 92 %   06/22/18 2130 98/57 - - - - - (!) 87 %   06/22/18 2115 125/66 - - - - - 91 %   06/22/18 2100 - - - - - - 90 %   06/22/18 2042 125/64 97.8  F (36.6  C) Oral " 133 133 22 94 %      Physical Exam  General: Patient is alert and interactive when I enter the room  Head:  The scalp, face, and head appear normal  Eyes:  Conjunctivae are normal but injected bilaterally, PERRL  ENT:    The nose is normal    Pinnae are normal    External acoustic canals are normal  Neck:  Trachea midline, good ROM   CV:  Pulses are normal, tachycardic, regular   Resp:  No respiratory distress, CTAB   Abdomen:      Soft, non-tender, non-distended  Musc:  Normal muscular tone    No major joint effusions  Skin:  No rash or lesions noted  Neuro:  Mildly slurred speech. Face is symmetric.     Moving all extremities well.   Psych: Awake. Alert.  Normal affect.  Appropriate interactions.    Emergency Department Course   ECG (21:48:51):  Rate 114 bpm. IN interval 168. QRS duration 80. QT/QTc 316/435. P-R-T axes 58 41 42. Sinus tachycardia. Otherwise normal ECG. Interpreted at 2251 by Sherry Hillman MD.     Laboratory:  CBC: WNL (WBC 5.7, HGB 12.1, )  BMP: Glucose 111 (H), Calcium 8.3 (L), o/w WNL (Creatinine 0.81)  Alcohol level (blood): 0.15 (H)  Acetaminophen level: <2    Interventions:  2151: NS 1L IV Bolus      Emergency Department Course:  The patient arrived in the emergency department via EMS.    Past medical records, nursing notes, and vitals reviewed.  2103: I performed an exam of the patient and obtained history, as documented above.  IV inserted and blood drawn.   EKG obtained, results above.    2248: I rechecked the patient. Explained findings to the patient.    Findings and plan explained to the patient. Patient discharged home with instructions regarding supportive care, medications, and reasons to return. The importance of close follow-up was reviewed.    Impression & Plan    Medical Decision Making:  Isa Eller 56 yo F with a history of HTN, hyperlipidemia, clotting disorder and PAD who presents drug ingestion.  Patient arrives because she is concerns for symptoms after taking a  marijuana type pill.  She arrives here and is tachycardic but otherwise well-appearing.  She does admit to alcohol use today which seems more consistent with her current intoxication state.  Blood alcohol returned at 0.15.  Patient was given IV fluids and observed for several hours.  Her blood work was returned normal.  Her EKG was nonischemic and her Tylenol level is negative.  She does not have anyone to stay at with her so I think patient should observe for another hour to make sure that her heart rate comes down and she is clinically sober.  She denies any suicidal ideations and given that she voluntarily came in I do not think me to place her on a hold.  Patient was signed out to Dr. Cooper for continued observation and then disposition once more clinically sober.    Diagnosis:    ICD-10-CM    1. Accidental drug ingestion, initial encounter T50.901A        Disposition:  discharged to home    Discharge Medications:  New Prescriptions    No medications on file         Penny Hernandez  6/22/2018   [facility] EMERGENCY DEPARTMENT    I, Penny Hernandez, am serving as a scribe at 9:03 PM on 6/22/2018 to document services personally performed by Sherry Hillman MD based on my observations and the provider's statements to me.       Sherry Hillman MD  06/23/18 [id number]

## 2018-06-24 LAB — INTERPRETATION ECG - MUSE: NORMAL

## 2020-10-07 ENCOUNTER — HOSPITAL ENCOUNTER (INPATIENT)
Facility: CLINIC | Age: 60
LOS: 3 days | Discharge: HOME | DRG: 389 | End: 2020-10-10
Attending: EMERGENCY MEDICINE | Admitting: INTERNAL MEDICINE
Payer: COMMERCIAL

## 2020-10-07 ENCOUNTER — APPOINTMENT (OUTPATIENT)
Dept: CT IMAGING | Facility: CLINIC | Age: 60
DRG: 389 | End: 2020-10-07
Attending: EMERGENCY MEDICINE
Payer: COMMERCIAL

## 2020-10-07 DIAGNOSIS — K43.2 INCISIONAL HERNIA, WITHOUT OBSTRUCTION OR GANGRENE: ICD-10-CM

## 2020-10-07 DIAGNOSIS — N17.9 AKI (ACUTE KIDNEY INJURY) (H): ICD-10-CM

## 2020-10-07 DIAGNOSIS — E87.20 LACTIC ACIDOSIS: ICD-10-CM

## 2020-10-07 DIAGNOSIS — E87.6 HYPOKALEMIA: ICD-10-CM

## 2020-10-07 DIAGNOSIS — K56.609 SBO (SMALL BOWEL OBSTRUCTION) (H): ICD-10-CM

## 2020-10-07 PROBLEM — K56.7 ILEUS (H): Status: ACTIVE | Noted: 2020-10-07

## 2020-10-07 LAB
ALBUMIN SERPL-MCNC: 4.4 G/DL (ref 3.4–5)
ALBUMIN UR-MCNC: 70 MG/DL
ALP SERPL-CCNC: 101 U/L (ref 40–150)
ALT SERPL W P-5'-P-CCNC: 54 U/L (ref 0–50)
ANION GAP SERPL CALCULATED.3IONS-SCNC: 12 MMOL/L (ref 3–14)
APPEARANCE UR: CLEAR
AST SERPL W P-5'-P-CCNC: 42 U/L (ref 0–45)
BASOPHILS # BLD AUTO: 0.1 10E9/L (ref 0–0.2)
BASOPHILS NFR BLD AUTO: 0.4 %
BILIRUB SERPL-MCNC: 1 MG/DL (ref 0.2–1.3)
BILIRUB UR QL STRIP: NEGATIVE
BUN SERPL-MCNC: 20 MG/DL (ref 7–30)
CALCIUM SERPL-MCNC: 9.1 MG/DL (ref 8.5–10.1)
CHLORIDE SERPL-SCNC: 97 MMOL/L (ref 94–109)
CO2 SERPL-SCNC: 23 MMOL/L (ref 20–32)
COLOR UR AUTO: YELLOW
CREAT SERPL-MCNC: 1.71 MG/DL (ref 0.52–1.04)
DIFFERENTIAL METHOD BLD: ABNORMAL
EOSINOPHIL # BLD AUTO: 0.1 10E9/L (ref 0–0.7)
EOSINOPHIL NFR BLD AUTO: 0.5 %
ERYTHROCYTE [DISTWIDTH] IN BLOOD BY AUTOMATED COUNT: 12.6 % (ref 10–15)
GFR SERPL CREATININE-BSD FRML MDRD: 32 ML/MIN/{1.73_M2}
GLUCOSE SERPL-MCNC: 121 MG/DL (ref 70–99)
GLUCOSE UR STRIP-MCNC: NEGATIVE MG/DL
HCT VFR BLD AUTO: 44.5 % (ref 35–47)
HGB BLD-MCNC: 15 G/DL (ref 11.7–15.7)
HGB UR QL STRIP: ABNORMAL
HYALINE CASTS #/AREA URNS LPF: 34 /LPF (ref 0–2)
IMM GRANULOCYTES # BLD: 0.2 10E9/L (ref 0–0.4)
IMM GRANULOCYTES NFR BLD: 1 %
KETONES UR STRIP-MCNC: NEGATIVE MG/DL
LABORATORY COMMENT REPORT: NORMAL
LACTATE BLD-SCNC: 1.5 MMOL/L (ref 0.7–2)
LACTATE BLD-SCNC: 3.1 MMOL/L (ref 0.7–2)
LEUKOCYTE ESTERASE UR QL STRIP: NEGATIVE
LIPASE SERPL-CCNC: 164 U/L (ref 73–393)
LYMPHOCYTES # BLD AUTO: 1.2 10E9/L (ref 0.8–5.3)
LYMPHOCYTES NFR BLD AUTO: 7.4 %
MAGNESIUM SERPL-MCNC: 1.9 MG/DL (ref 1.6–2.3)
MCH RBC QN AUTO: 31.4 PG (ref 26.5–33)
MCHC RBC AUTO-ENTMCNC: 33.7 G/DL (ref 31.5–36.5)
MCV RBC AUTO: 93 FL (ref 78–100)
MONOCYTES # BLD AUTO: 0.9 10E9/L (ref 0–1.3)
MONOCYTES NFR BLD AUTO: 5.6 %
MUCOUS THREADS #/AREA URNS LPF: PRESENT /LPF
NEUTROPHILS # BLD AUTO: 13.7 10E9/L (ref 1.6–8.3)
NEUTROPHILS NFR BLD AUTO: 85.1 %
NITRATE UR QL: NEGATIVE
NRBC # BLD AUTO: 0 10*3/UL
NRBC BLD AUTO-RTO: 0 /100
PH UR STRIP: 6 PH (ref 5–7)
PLATELET # BLD AUTO: 236 10E9/L (ref 150–450)
POTASSIUM SERPL-SCNC: 3 MMOL/L (ref 3.4–5.3)
POTASSIUM SERPL-SCNC: 3.7 MMOL/L (ref 3.4–5.3)
PROT SERPL-MCNC: 8 G/DL (ref 6.8–8.8)
RBC # BLD AUTO: 4.78 10E12/L (ref 3.8–5.2)
RBC #/AREA URNS AUTO: <1 /HPF (ref 0–2)
SARS-COV-2 RNA SPEC QL NAA+PROBE: NEGATIVE
SARS-COV-2 RNA SPEC QL NAA+PROBE: NORMAL
SODIUM SERPL-SCNC: 132 MMOL/L (ref 133–144)
SOURCE: ABNORMAL
SP GR UR STRIP: 1.02 (ref 1–1.03)
SPECIMEN SOURCE: NORMAL
SPECIMEN SOURCE: NORMAL
SQUAMOUS #/AREA URNS AUTO: 2 /HPF (ref 0–1)
UROBILINOGEN UR STRIP-MCNC: NORMAL MG/DL (ref 0–2)
WBC # BLD AUTO: 16.1 10E9/L (ref 4–11)
WBC #/AREA URNS AUTO: 1 /HPF (ref 0–5)

## 2020-10-07 PROCEDURE — 96366 THER/PROPH/DIAG IV INF ADDON: CPT

## 2020-10-07 PROCEDURE — 99222 1ST HOSP IP/OBS MODERATE 55: CPT | Performed by: SURGERY

## 2020-10-07 PROCEDURE — 250N000009 HC RX 250: Performed by: INTERNAL MEDICINE

## 2020-10-07 PROCEDURE — 74176 CT ABD & PELVIS W/O CONTRAST: CPT

## 2020-10-07 PROCEDURE — 83690 ASSAY OF LIPASE: CPT | Performed by: EMERGENCY MEDICINE

## 2020-10-07 PROCEDURE — 96361 HYDRATE IV INFUSION ADD-ON: CPT

## 2020-10-07 PROCEDURE — 120N000001 HC R&B MED SURG/OB

## 2020-10-07 PROCEDURE — C9113 INJ PANTOPRAZOLE SODIUM, VIA: HCPCS | Performed by: INTERNAL MEDICINE

## 2020-10-07 PROCEDURE — 96365 THER/PROPH/DIAG IV INF INIT: CPT

## 2020-10-07 PROCEDURE — 83735 ASSAY OF MAGNESIUM: CPT | Performed by: EMERGENCY MEDICINE

## 2020-10-07 PROCEDURE — 80053 COMPREHEN METABOLIC PANEL: CPT | Performed by: EMERGENCY MEDICINE

## 2020-10-07 PROCEDURE — 84132 ASSAY OF SERUM POTASSIUM: CPT | Performed by: INTERNAL MEDICINE

## 2020-10-07 PROCEDURE — C9803 HOPD COVID-19 SPEC COLLECT: HCPCS

## 2020-10-07 PROCEDURE — 36415 COLL VENOUS BLD VENIPUNCTURE: CPT | Performed by: INTERNAL MEDICINE

## 2020-10-07 PROCEDURE — U0003 INFECTIOUS AGENT DETECTION BY NUCLEIC ACID (DNA OR RNA); SEVERE ACUTE RESPIRATORY SYNDROME CORONAVIRUS 2 (SARS-COV-2) (CORONAVIRUS DISEASE [COVID-19]), AMPLIFIED PROBE TECHNIQUE, MAKING USE OF HIGH THROUGHPUT TECHNOLOGIES AS DESCRIBED BY CMS-2020-01-R: HCPCS | Performed by: EMERGENCY MEDICINE

## 2020-10-07 PROCEDURE — 250N000011 HC RX IP 250 OP 636: Performed by: EMERGENCY MEDICINE

## 2020-10-07 PROCEDURE — 250N000011 HC RX IP 250 OP 636: Performed by: INTERNAL MEDICINE

## 2020-10-07 PROCEDURE — 258N000003 HC RX IP 258 OP 636: Performed by: EMERGENCY MEDICINE

## 2020-10-07 PROCEDURE — 81001 URINALYSIS AUTO W/SCOPE: CPT | Performed by: EMERGENCY MEDICINE

## 2020-10-07 PROCEDURE — 99223 1ST HOSP IP/OBS HIGH 75: CPT | Mod: AI | Performed by: INTERNAL MEDICINE

## 2020-10-07 PROCEDURE — 99285 EMERGENCY DEPT VISIT HI MDM: CPT | Mod: 25

## 2020-10-07 PROCEDURE — 96375 TX/PRO/DX INJ NEW DRUG ADDON: CPT

## 2020-10-07 PROCEDURE — 85025 COMPLETE CBC W/AUTO DIFF WBC: CPT | Performed by: EMERGENCY MEDICINE

## 2020-10-07 PROCEDURE — 83605 ASSAY OF LACTIC ACID: CPT | Performed by: EMERGENCY MEDICINE

## 2020-10-07 RX ORDER — MORPHINE SULFATE 4 MG/ML
4 INJECTION, SOLUTION INTRAMUSCULAR; INTRAVENOUS ONCE
Status: COMPLETED | OUTPATIENT
Start: 2020-10-07 | End: 2020-10-07

## 2020-10-07 RX ORDER — MORPHINE SULFATE 2 MG/ML
2-4 INJECTION, SOLUTION INTRAMUSCULAR; INTRAVENOUS
Status: DISCONTINUED | OUTPATIENT
Start: 2020-10-07 | End: 2020-10-10 | Stop reason: HOSPADM

## 2020-10-07 RX ORDER — METOPROLOL TARTRATE 1 MG/ML
5 INJECTION, SOLUTION INTRAVENOUS EVERY 6 HOURS
Status: DISCONTINUED | OUTPATIENT
Start: 2020-10-07 | End: 2020-10-10 | Stop reason: HOSPADM

## 2020-10-07 RX ORDER — ONDANSETRON 4 MG/1
4 TABLET, ORALLY DISINTEGRATING ORAL EVERY 6 HOURS PRN
Status: DISCONTINUED | OUTPATIENT
Start: 2020-10-08 | End: 2020-10-10 | Stop reason: HOSPADM

## 2020-10-07 RX ORDER — HYDRALAZINE HYDROCHLORIDE 20 MG/ML
10 INJECTION INTRAMUSCULAR; INTRAVENOUS EVERY 4 HOURS PRN
Status: DISCONTINUED | OUTPATIENT
Start: 2020-10-07 | End: 2020-10-10 | Stop reason: HOSPADM

## 2020-10-07 RX ORDER — ACETAMINOPHEN 650 MG/1
650 SUPPOSITORY RECTAL EVERY 4 HOURS PRN
Status: DISCONTINUED | OUTPATIENT
Start: 2020-10-07 | End: 2020-10-10 | Stop reason: HOSPADM

## 2020-10-07 RX ORDER — LOSARTAN POTASSIUM AND HYDROCHLOROTHIAZIDE 12.5; 5 MG/1; MG/1
1 TABLET ORAL DAILY
COMMUNITY
End: 2022-02-14

## 2020-10-07 RX ORDER — POTASSIUM CL/LIDO/0.9 % NACL 10MEQ/0.1L
10 INTRAVENOUS SOLUTION, PIGGYBACK (ML) INTRAVENOUS ONCE
Status: COMPLETED | OUTPATIENT
Start: 2020-10-07 | End: 2020-10-07

## 2020-10-07 RX ORDER — ONDANSETRON 2 MG/ML
4 INJECTION INTRAMUSCULAR; INTRAVENOUS EVERY 6 HOURS PRN
Status: DISCONTINUED | OUTPATIENT
Start: 2020-10-08 | End: 2020-10-10 | Stop reason: HOSPADM

## 2020-10-07 RX ORDER — ONDANSETRON 2 MG/ML
4 INJECTION INTRAMUSCULAR; INTRAVENOUS ONCE
Status: COMPLETED | OUTPATIENT
Start: 2020-10-07 | End: 2020-10-07

## 2020-10-07 RX ORDER — PROCHLORPERAZINE 25 MG
25 SUPPOSITORY, RECTAL RECTAL EVERY 12 HOURS PRN
Status: DISCONTINUED | OUTPATIENT
Start: 2020-10-07 | End: 2020-10-10 | Stop reason: HOSPADM

## 2020-10-07 RX ORDER — LORAZEPAM 2 MG/ML
0.5 INJECTION INTRAMUSCULAR EVERY 6 HOURS PRN
Status: DISCONTINUED | OUTPATIENT
Start: 2020-10-07 | End: 2020-10-08

## 2020-10-07 RX ORDER — ROSUVASTATIN CALCIUM 40 MG/1
40 TABLET, COATED ORAL EVERY EVENING
COMMUNITY

## 2020-10-07 RX ORDER — LIDOCAINE 40 MG/G
CREAM TOPICAL
Status: DISCONTINUED | OUTPATIENT
Start: 2020-10-07 | End: 2020-10-10 | Stop reason: HOSPADM

## 2020-10-07 RX ORDER — NALOXONE HYDROCHLORIDE 0.4 MG/ML
.1-.4 INJECTION, SOLUTION INTRAMUSCULAR; INTRAVENOUS; SUBCUTANEOUS
Status: DISCONTINUED | OUTPATIENT
Start: 2020-10-07 | End: 2020-10-10 | Stop reason: HOSPADM

## 2020-10-07 RX ORDER — SODIUM CHLORIDE AND POTASSIUM CHLORIDE 150; 900 MG/100ML; MG/100ML
INJECTION, SOLUTION INTRAVENOUS CONTINUOUS
Status: DISCONTINUED | OUTPATIENT
Start: 2020-10-07 | End: 2020-10-10

## 2020-10-07 RX ORDER — PROCHLORPERAZINE MALEATE 5 MG
10 TABLET ORAL EVERY 6 HOURS PRN
Status: DISCONTINUED | OUTPATIENT
Start: 2020-10-07 | End: 2020-10-10 | Stop reason: HOSPADM

## 2020-10-07 RX ADMIN — METOPROLOL TARTRATE 5 MG: 5 INJECTION INTRAVENOUS at 22:34

## 2020-10-07 RX ADMIN — POTASSIUM CHLORIDE AND SODIUM CHLORIDE: 900; 150 INJECTION, SOLUTION INTRAVENOUS at 22:25

## 2020-10-07 RX ADMIN — MORPHINE SULFATE 4 MG: 4 INJECTION INTRAVENOUS at 17:43

## 2020-10-07 RX ADMIN — Medication 10 MEQ: at 18:59

## 2020-10-07 RX ADMIN — Medication 10 MEQ: at 20:17

## 2020-10-07 RX ADMIN — ONDANSETRON 4 MG: 2 INJECTION INTRAMUSCULAR; INTRAVENOUS at 17:44

## 2020-10-07 RX ADMIN — SODIUM CHLORIDE 1000 ML: 9 INJECTION, SOLUTION INTRAVENOUS at 17:22

## 2020-10-07 RX ADMIN — PANTOPRAZOLE SODIUM 40 MG: 40 INJECTION, POWDER, FOR SOLUTION INTRAVENOUS at 22:30

## 2020-10-07 ASSESSMENT — ENCOUNTER SYMPTOMS
DYSURIA: 0
FEVER: 0
SORE THROAT: 0
VOMITING: 1
NAUSEA: 1
BACK PAIN: 0
ABDOMINAL PAIN: 1
RHINORRHEA: 0

## 2020-10-07 ASSESSMENT — MIFFLIN-ST. JEOR: SCORE: 1365.45

## 2020-10-07 NOTE — ED TRIAGE NOTES
PT presents to ED with abdominal pain since noon. Also reports vomiting and diarrhea. PT reports that her stomach feels very hard. Hx of incisional hernia after bifemoral aortal bypass in 2018.

## 2020-10-07 NOTE — ED PROVIDER NOTES
"  History   Chief Complaint:  Abdominal Pain, Vomiting    The history is provided by the patient.      Isa Eller is a 59 year old female with history of hypertension and hyperlipidemia who presents for evaluation of abdominal pain. Isa was at her baseline state of health when she awoke this morning and had eggs for breakfast. However, about 5 hours prior to arrival she developed intermittent cramping upper right and left abdominal pain that is severe with these cramps about every 5 minutes. She had nausea and eventually emesis. She had a normal bowel movement just before her pain began. She has no associated fever, rhinorrhea, sore throat, cough, back or flank pain, or dysuria.    Notably, Isa has an incisional hernia and expresses concern for strangulation. She feels it is \"hard\" when she is having the cramping pain. She denies any history of small bowel obstructions.    Allergies:  No Known Drug Allergies     Medications:   Amlodipine   Aspirin   Lipitor  Klonopin  Metoprolol  Prilosec  Zofran   Crestor  Hyzaar    Past Medical History:    Anxiety   Clotting disorder  Hypertension  Hyperlipidemia  Peripheral artery disease      Past Surgical History:    Breast surgery   Cardiac surgery, bypass  Cholecystectomy   Vascular surgery    PICC line insertion    Family History:    Father - CAD, Hypertension, PE/DVT  Mother - Alcohol/drug abuse, Cancer, Glaucoma, Hypertension    Social History:  The patient was unaccompanied to the ED.  Smoking Status: Current Every Day Smoker  Smokeless Tobacco: Never Used  Alcohol Use: Yes  Drug Use: Yes - Marijuana   PCP: Amena Stearns   Marital Status: Single    Review of Systems   Constitutional: Negative for fever.   HENT: Negative for rhinorrhea and sore throat.    Respiratory: Negative for cough.    Gastrointestinal: Positive for abdominal pain, nausea and vomiting. Negative for diarrhea.   Genitourinary: Negative for dysuria and flank pain.   Musculoskeletal: Negative for " back pain.   All other systems reviewed and are negative.    Physical Exam     Patient Vitals for the past 24 hrs:   BP Temp Temp src Pulse Resp SpO2 Weight   10/07/20 2117 (!) 165/55 96.8  F (36  C) Oral 84 18 97 % 80.3 kg (177 lb 1.6 oz)   10/07/20 2039 -- -- -- -- 16 97 % --   10/07/20 2035 (!) 156/72 -- -- 80 -- 97 % --   10/07/20 1822 -- -- -- -- -- 96 % --   10/07/20 1821 -- -- -- -- -- 96 % --   10/07/20 1820 -- -- -- -- -- 97 % --   10/07/20 1815 -- -- -- -- -- 96 % --   10/07/20 1810 -- -- -- -- -- 95 % --   10/07/20 1805 -- -- -- -- -- 98 % --   10/07/20 1800 (!) 143/65 -- -- 76 -- 95 % --   10/07/20 1755 -- -- -- -- -- 98 % --   10/07/20 1750 (!) 146/64 -- -- 75 -- 98 % --   10/07/20 1646 (!) 144/115 97.6  F (36.4  C) Oral 91 -- 99 % 81.6 kg (180 lb)     Physical Exam  General: Well-developed and well-nourished. Well appearing middle aged  woman. Cooperative.  Head:  Atraumatic.  Eyes:  Conjunctivae, lids, and sclerae are normal.  Neck:  Supple. Normal range of motion.  CV:  Regular rate and rhythm. Normal heart sounds with no murmurs, rubs, or gallops detected.  Resp:  No respiratory distress. Clear to auscultation bilaterally without decreased breath sounds, wheezing, rales, or rhonchi.  GI:  Soft. Non-distended. Bilateral upper abdominal tenderness. Incisional hernia palpable only with bearing down/cough and is soft. Well healed old laparotomy incision.     MS:  Normal ROM.   Skin:  Warm. Non-diaphoretic. No pallor.  Neuro:  Awake. A&Ox3. Normal strength.  Psych: Normal mood and affect. Normal speech.  Vitals reviewed.    Emergency Department Course   Imaging:  Radiology findings were communicated with the patient and Admitting MD who voiced understanding of the findings.    CT Abdomen Pelvis w/o Contrast  IMPRESSION:   1.  Diffuse distention of small bowel with mild wall thickening of some of the small bowel loops in lower abdomen with mesenteric congestion. Suspect ileus although cannot  absolutely exclude a developing distal small bowel obstruction. No clear transition. There is a supraumbilical left-sided incisional hernia containing a knuckle of small bowel although no definite transition is seen, there is mesenteric congestion at the hernia site.  Reading per radiology.      Laboratory:  Laboratory findings were communicated with the patient and Admitting MD who voiced understanding of the findings.    CBC: WBC 16.1 (H) o/w WNL (HGB 15.0, )  CMP:  (L), Potassium 3.0 (L), Glucose 121 (H), Creatinine 1.71 (H), GFR 32 (L), Alt 54 (H) o/w WNL   Lactic Acid (Collected at 1719): 3.1 (H)   Lactic Acid (Collected at 1902): 1.5   Lipase: 164   Magnesium: 1.9    UA with Microscopic: Blood Trace (A), Protein albumin 70 (A), Squamous epithelial/HPF 2 (H), Mucous Present (A), Hyaline Casts 34 (H) o/w WNL     COVID-19 Virus (Coronavirus) by PCR: Negative     Interventions:  1722 0.9% NaCl bolus 1000 mL IV   1743 Morphine 4 mg IV  1744 Zofran 4 mg IV  1859 Potassium chloride intermittent infusion 10 mEq IV  2017 Potassium chloride intermittent infusion 10 mEq IV    Emergency Department Course:  Past medical records, nursing notes, and vitals reviewed.  The patient was sent for a CT Abdomen Pelvis w/o Contrast while in the emergency department, results above.    IV was inserted and blood was drawn for laboratory testing, results above.   The patient provided a urine sample here in the emergency department. This was sent for laboratory testing, findings above.     (1705)   I performed an exam of the patient as documented above. History obtained from patient.     (1858)   I rechecked the patient and discussed results and plan of care. She reports feeling much better.     (1905)   I spoke with Dr. Painter of the general surgery service regarding patient's presentation, findings, and plan of care.      (1921)   I spoke with Dr. Rocha of the hospitalist service regarding patient's presentation,  findings, and plan of care.      Findings and plan explained to the patient who consents to admission. Discussed the patient with Dr. Rocha who will admit the patient to a medical bed for further monitoring, evaluation, and treatment. I personally reviewed the laboratory and imaging results with the patient and answered all related questions prior to admission.     Impression & Plan   CMS Diagnoses: The Lactic acid level is elevated due to vomiting and dehydration, at this time there is no sign of severe sepsis or septic shock. and None     Covid-19  Isa Eller was evaluated during a global COVID-19 pandemic, which necessitated consideration that the patient might be at risk for infection with the SARS-CoV-2 virus that causes COVID-19.   Applicable protocols for evaluation were followed during the patient's care.   COVID-19 was considered as part of the patient's evaluation. The plan for testing is:  a test was obtained during this visit.     Medical Decision Making:  Isa is a 59-year-old female who developed upper abdominal pain about 5 hours ago which was followed by nausea and multiple episodes of vomiting.  The pain is intermittent and when present she has a severe cramp - about every 5 minutes.  She denies all other concerns or complaints but does note that her known incisional hernia seems hard when she has the pain.  Fortunately, she appears quite well on exam.  She does have mild tenderness primarily in the upper quadrants.  Her hernia is palpable only when she coughs or bears down and seems soft and compressible.  CT of the abdomen and pelvis reveals small bowel distention with mild wall thickening.  This is could be related to ileus although a developing SBO is also considered.  There is mesenteric congestion at the hernia site but this does not appear to be a transition point.  Her initial lactic acid is elevated at 3.1.  This resolved with 1 L of IV fluids and is likely related to her vomiting and  relative dehydration.  She is hypokalemic to 3.0 which was repleted with 20 mEq of IV potassium.  Mild hyponatremia will likely resolve with the aforementioned IV fluids, although she does have a creatinine elevation at 1.71 from baseline normal 1.04 in August 2020.  Again, this is likely due to hypovolemia.  There is no evidence of biliary obstruction and minimal elevation in ALT is unlikely be clinically relevant.  There is no evidence of pancreatitis or anemia.  Leukocytosis to 16.1 is likely demarginalization from vomiting.  There is no UTI.      Fortunately, her pain was markedly improved with morphine and she had no further vomiting after Zofran.  I did discuss her case with on-call general surgery, Dr. Painter, who will evaluate the patient later this evening and has no further orders.  I updated Isa on findings and plan and answered all her questions. She verbalized understanding and is amenable to admission.  I discussed the patient's case with Dr. Rocha, hospitalist, who accepts admission and has no further orders.    Diagnosis:    ICD-10-CM    1. SBO (small bowel obstruction) (H)  K56.609    2. Incisional hernia, without obstruction or gangrene  K43.2    3. ROCIO (acute kidney injury) (H)  N17.9    4. Lactic acidosis  E87.2    5. Hypokalemia  E87.6      Disposition:  Admitted to a medical bed under the care of Dr. Rocha.    Scribe Disclosure:  KING Puma Heredia, am serving as a scribe at 5:04 PM on 10/7/2020 to document services personally performed by Carleen Mccormack MD based on my observations and the provider's statements to me.  October 7, 2020   [facility] EMERGENCY DEPARTMENT        Carleen Mccormack MD  10/08/20 0229

## 2020-10-08 LAB
ANION GAP SERPL CALCULATED.3IONS-SCNC: 6 MMOL/L (ref 3–14)
BUN SERPL-MCNC: 17 MG/DL (ref 7–30)
C DIFF TOX B STL QL: NEGATIVE
CALCIUM SERPL-MCNC: 8.1 MG/DL (ref 8.5–10.1)
CHLORIDE SERPL-SCNC: 105 MMOL/L (ref 94–109)
CO2 SERPL-SCNC: 27 MMOL/L (ref 20–32)
CREAT SERPL-MCNC: 1.42 MG/DL (ref 0.52–1.04)
ERYTHROCYTE [DISTWIDTH] IN BLOOD BY AUTOMATED COUNT: 12.9 % (ref 10–15)
GFR SERPL CREATININE-BSD FRML MDRD: 39 ML/MIN/{1.73_M2}
GLUCOSE SERPL-MCNC: 108 MG/DL (ref 70–99)
HCT VFR BLD AUTO: 36.6 % (ref 35–47)
HGB BLD-MCNC: 12.2 G/DL (ref 11.7–15.7)
MCH RBC QN AUTO: 31.8 PG (ref 26.5–33)
MCHC RBC AUTO-ENTMCNC: 33.3 G/DL (ref 31.5–36.5)
MCV RBC AUTO: 95 FL (ref 78–100)
PLATELET # BLD AUTO: 174 10E9/L (ref 150–450)
POTASSIUM SERPL-SCNC: 3.6 MMOL/L (ref 3.4–5.3)
RBC # BLD AUTO: 3.84 10E12/L (ref 3.8–5.2)
SODIUM SERPL-SCNC: 138 MMOL/L (ref 133–144)
SPECIMEN SOURCE: NORMAL
WBC # BLD AUTO: 6.9 10E9/L (ref 4–11)

## 2020-10-08 PROCEDURE — 250N000009 HC RX 250: Performed by: INTERNAL MEDICINE

## 2020-10-08 PROCEDURE — 250N000011 HC RX IP 250 OP 636: Performed by: INTERNAL MEDICINE

## 2020-10-08 PROCEDURE — 99233 SBSQ HOSP IP/OBS HIGH 50: CPT | Performed by: INTERNAL MEDICINE

## 2020-10-08 PROCEDURE — 36415 COLL VENOUS BLD VENIPUNCTURE: CPT | Performed by: INTERNAL MEDICINE

## 2020-10-08 PROCEDURE — 85027 COMPLETE CBC AUTOMATED: CPT | Performed by: INTERNAL MEDICINE

## 2020-10-08 PROCEDURE — C9113 INJ PANTOPRAZOLE SODIUM, VIA: HCPCS | Performed by: INTERNAL MEDICINE

## 2020-10-08 PROCEDURE — 120N000001 HC R&B MED SURG/OB

## 2020-10-08 PROCEDURE — 80048 BASIC METABOLIC PNL TOTAL CA: CPT | Performed by: INTERNAL MEDICINE

## 2020-10-08 PROCEDURE — 87493 C DIFF AMPLIFIED PROBE: CPT | Performed by: INTERNAL MEDICINE

## 2020-10-08 RX ORDER — LORAZEPAM 2 MG/ML
0.5 INJECTION INTRAMUSCULAR EVERY 6 HOURS PRN
Status: DISCONTINUED | OUTPATIENT
Start: 2020-10-08 | End: 2020-10-10 | Stop reason: HOSPADM

## 2020-10-08 RX ADMIN — METOPROLOL TARTRATE 5 MG: 5 INJECTION INTRAVENOUS at 14:52

## 2020-10-08 RX ADMIN — LORAZEPAM 0.5 MG: 2 INJECTION INTRAMUSCULAR; INTRAVENOUS at 22:30

## 2020-10-08 RX ADMIN — POTASSIUM CHLORIDE AND SODIUM CHLORIDE: 900; 150 INJECTION, SOLUTION INTRAVENOUS at 08:40

## 2020-10-08 RX ADMIN — METOPROLOL TARTRATE 5 MG: 5 INJECTION INTRAVENOUS at 21:14

## 2020-10-08 RX ADMIN — POTASSIUM CHLORIDE AND SODIUM CHLORIDE: 900; 150 INJECTION, SOLUTION INTRAVENOUS at 19:29

## 2020-10-08 RX ADMIN — METOPROLOL TARTRATE 5 MG: 5 INJECTION INTRAVENOUS at 08:52

## 2020-10-08 RX ADMIN — MORPHINE SULFATE 2 MG: 2 INJECTION, SOLUTION INTRAMUSCULAR; INTRAVENOUS at 14:38

## 2020-10-08 RX ADMIN — METOPROLOL TARTRATE 5 MG: 5 INJECTION INTRAVENOUS at 04:27

## 2020-10-08 RX ADMIN — PANTOPRAZOLE SODIUM 40 MG: 40 INJECTION, POWDER, FOR SOLUTION INTRAVENOUS at 21:11

## 2020-10-08 RX ADMIN — LORAZEPAM 0.5 MG: 2 INJECTION INTRAMUSCULAR; INTRAVENOUS at 00:58

## 2020-10-08 ASSESSMENT — ENCOUNTER SYMPTOMS
COUGH: 0
FLANK PAIN: 0
DIARRHEA: 0

## 2020-10-08 ASSESSMENT — ACTIVITIES OF DAILY LIVING (ADL)
ADLS_ACUITY_SCORE: 11

## 2020-10-08 NOTE — PHARMACY-ADMISSION MEDICATION HISTORY
Admission medication history interview status for this patient is complete. See [facility] admission navigator for allergy information, prior to admission medications and immunization status.     Medication history interview done via telephone during Covid-19 pandemic, indicate source(s): Patient  Medication history resources (including written lists, pill bottles, clinic record):None  Pharmacy: [facility] [address] in [place]    Changes made to PTA medication list:  Added: Losartan/HCTZ, Rosuvastatin  Deleted: Amlodipine, Atorvastatin, Calcium Carbonate, Cyclobenzaprine, Ondansetron, Polyethylene Glycol  Changed: Clonazepam daily PRN --> BID PRN    Actions taken by pharmacist (provider contacted, etc):None     Additional medication history information:None    Medication reconciliation/reorder completed by provider prior to medication history?  No   (Y/N)     Prior to Admission medications    Medication Sig Last Dose Taking? Auth Provider   ASPIRIN EC PO Take 81 mg by mouth daily  10/6/2020 at Unknown time Yes Reported, Patient   losartan-hydrochlorothiazide (HYZAAR) 50-12.5 MG tablet Take 1 tablet by mouth daily 10/6/2020 at Unknown time Yes Unknown, Entered By History   Metoprolol Succinate (TOPROL XL PO) Take 100 mg by mouth daily 10/6/2020 at Unknown time Yes Unknown, Entered By History   omeprazole (PRILOSEC) 40 MG capsule Take 1 capsule (40 mg) by mouth daily Take 30-60 minutes before a meal. 10/6/2020 at Unknown time Yes Penny Handy PA-C   rosuvastatin (CRESTOR) 40 MG tablet Take 40 mg by mouth every evening 10/6/2020 at Unknown time Yes Unknown, Entered By History   VITAMIN D, CHOLECALCIFEROL, PO Take 50,000 Units by mouth twice a week On Sunday and Wednesday. Past Week at Unknown time Yes Reported, Patient   clonazePAM (KLONOPIN) 1 MG tablet Take 1 mg by mouth 2 times daily as needed for anxiety  More than a month at Unknown time  Unknown, Entered By History

## 2020-10-08 NOTE — H&P
[facility]  Hospitalist Admission Note  Name: Isa Eller    MRN: [id number]  YOB: 1960    Age: 59 year old  Date of admission: 10/7/2020  Primary care provider: Amena Stearns    Chief Complaint:  Abdominal pain    Assessment and Plan:   SBO: Presenting with sudden onset right upper and left upper quadrant abdominal pain that is intermittent and severe described as a severe cramp.  Pain only lasts a minute, but is coming back very frequently.  Having nausea and vomited twice.  She did have a normal bowel movement this morning before pain onset.  CT the abdomen pelvis shows dilated loops of small bowel and an incisional hernia with bowel in this area although the dilated loops are on both sides of the seems less likely the transition point.  She is very active bowel sounds on exam so I doubt ileus and much more suspect a mechanical SBO, likely from adhesions from previous abdominal surgeries.  Initial lactic acid was elevated, now normalized after 1 L normal saline .  I suspect this is more from hypovolemia given she also has ROCIO and hyponatremia.  -General surgery consulted  -Strict n.p.o., no meds for now  -IV fluids  -IV morphine and Zofran as needed for symptoms, feeling better now  -Place NG to low intermittent suction for decompression if having recurrence of vomiting and significant pain    ROCIO, hyponatremia: Creatinine 1.7 with baseline 1.0 consistent with ROCIO secondary to vomiting due to SBO.  Also on losartan-HCTZ.  Received 1 L NS in the ED.  -Hold losartan-HCTZ  -IV NS with 20 meq K at 100 ml/hr  -BMP tomorrow    Hypokalemia: Potassium 3.0.  Suspect in part due to vomiting and also the HCTZ she is on at baseline which will be held initially.  Received 10 mEq IV K x 2 in ED.  Is mild ROCIO.  -Hold hydrochlorothiazide  -Add IV potassium to maintenance fluids  -Recheck potassium in 4 hours, no oral potassium for now    PAD, HTN, HLD: History of bilateral iliac stenting in 2006  followed by aorto-iliac bypass in 2018.  Previously on anticoagulation, but no longer.  Does take aspirin 81 mg daily along with rosuvastatin 40 mg daily, metoprolol succinate 100 g daily, and losartan-HCTZ 50-12.5 mg daily.  Takes her medications at night with last dose last night.  SBP 140s in ED.  -Hold PTA oral regimen initially until improving including her aspirin  -Metoprolol IV 5 mg every 6 hours  -IV hydralazine for severely elevated blood pressure    Anxiety: PTA on clonazepam 1 mg twice daily as needed, rarely uses.  -IV lorazepam 0.5 mg every 6 hours as needed    Leukocytosis: WBC 16.1.  I suspect this is stress demargination from severe pain.  No fevers or chills.  No other infectious symptoms other than she vomited.    Asymptomatic COVID19 testing sent  DVT Prophylaxis: Pneumatic Compression Devices  Code Status: Full Code  FEN: Strict n.p.o., NS with 20 meq K at 100 ml/hr  Discharge Dispo: Home  Estimated Disch Date / # of Days until Disch: Admit inpatient for SBO and ROCIO.  Anticipate at least 2 night hospitalization.      History of Present Illness:  Isa Eller is a 59 year old female with PMH including PAD s/p bilateral iliac stenting followed by aorto-iliac bypass in 2018 on aspirin 81 mg, HTN, HLD, s/p lap cholecystectomy, and anxiety who presents with abdominal pain.  Patient was in normal state of health until around 5 PM she developed severe right upper quadrant and left upper quadrant abdominal pain.  She describes it as a intense cramping pain almost like a contraction.  A last approximately 30 seconds to 1 minute and then goes away.  She has never had cramping pain like this in severity before.  The pain is coming much more frequently now so she came in for evaluation.  She did have nausea and vomited twice.  Denies any hematemesis.  She did have a normal bowel movement this morning before the pain started and has not had any recent constipation or diarrhea although has issues with this  in the past.  Denies any melena or hematochezia.  Did not have any fevers or chills.  No sick contacts.  On review of systems denies any chest pain, cough, shortness of breath, dysuria.    History obtained from patient, medical record, and from Dr. Mccormack in the emergency department.  Blood pressure 146/64, heart rate 75, temperature 97.6  F, oxygen 98% on room air.  Leukocytosis of 16.1, hemoglobin 15.0, platelet count 236.  Sodium low 132, potassium 3.0, bicarb 23, creatinine 1.71, BUN 20.  LFTs within normal limits except for ALT 54.  Lipase 164.  Lactic acid elevated 3.1 down to 1.5 after 1 L of normal saline.  Urinalysis shows 1 WBC.  CT of the abdomen pelvis obtained which shows an incisional hernia with some bowel in the hernia along with multiple dilated small loops of bowel although the dilated loops are on both sides of the hernia.  She received Zofran and 4 mg of IV morphine and denies any pain currently.  She received 10 mEq IV potassium x2.  General surgery was notified from the ER given the elevated lactic acid.  Admit inpatient with general surgery consultation.  N.p.o.     Past Medical History reviewed:  Past Medical History:   Diagnosis Date     Anxiety      Clotting disorder (H)      Hyperlipemia      Hypertension      PAD (peripheral artery disease) (H)      Past Surgical History reviewed:  Past Surgical History:   Procedure Laterality Date     BREAST SURGERY       BYPASS GRAFT AORTOILIAC  2018     LAPAROSCOPIC CHOLECYSTECTOMY WITH CHOLANGIOGRAMS N/A 10/19/2014    Procedure: LAPAROSCOPIC CHOLECYSTECTOMY WITH CHOLANGIOGRAMS;  Surgeon: Seamus Holman MD;  Location: [facility] OR     VASCULAR SURGERY       Social History reviewed:  Social History     Tobacco Use     Smoking status: Former Smoker     Smokeless tobacco: Never Used     Tobacco comment: Vapes   Substance Use Topics     Alcohol use: Yes     Social History     Social History Narrative     Not on file     Family History reviewed:  Family  History   Problem Relation Age of Onset     Hypertension Mother      Heart Disease Father      Hypertension Father      Allergies:  No Known Allergies  Medications:  Prior to Admission medications    Medication Sig Last Dose Taking? Auth Provider   ASPIRIN EC PO Take 81 mg by mouth daily  10/6/2020 at Unknown time Yes Reported, Patient   losartan-hydrochlorothiazide (HYZAAR) 50-12.5 MG tablet Take 1 tablet by mouth daily 10/6/2020 at Unknown time Yes Unknown, Entered By History   Metoprolol Succinate (TOPROL XL PO) Take 100 mg by mouth daily 10/6/2020 at Unknown time Yes Unknown, Entered By History   omeprazole (PRILOSEC) 40 MG capsule Take 1 capsule (40 mg) by mouth daily Take 30-60 minutes before a meal. 10/6/2020 at Unknown time Yes Penny Handy PA-C   rosuvastatin (CRESTOR) 40 MG tablet Take 40 mg by mouth every evening 10/6/2020 at Unknown time Yes Unknown, Entered By History   VITAMIN D, CHOLECALCIFEROL, PO Take 50,000 Units by mouth twice a week On Sunday and Wednesday. Past Week at Unknown time Yes Reported, Patient   clonazePAM (KLONOPIN) 1 MG tablet Take 1 mg by mouth 2 times daily as needed for anxiety  More than a month at Unknown time  Unknown, Entered By History     Review of Systems:  A Comprehensive greater than 10 system review of systems was carried out.  Pertinent positives and negatives are noted above.  Otherwise negative.     Physical Exam:  Blood pressure (!) 143/65, pulse 76, temperature 97.6  F (36.4  C), temperature source Oral, weight 81.6 kg (180 lb), SpO2 96 %, not currently breastfeeding.  Wt Readings from Last 1 Encounters:   10/07/20 81.6 kg (180 lb)     Exam:  Constitutional: Awake, NAD   Eyes: sclera white   HEENT: atraumatic, MMM  Respiratory:  lungs cta bilaterally, no crackles or wheeze  Cardiovascular: RRR.  No murmur   GI: Mildly distended, no significant tenderness to palpation on exam, very active bowel sounds  Skin: no rash or lesions,  acyanotic  Musculoskeletal/extremities: atraumatic, no major deformities. No edema  Neurologic: A&O, speech clear, moves extremities equally  Psychiatric: calm, cooperative, normal affect    Lab and imaging data personally reviewed:  Labs:  Recent Labs   Lab 10/07/20  1719   WBC 16.1*   HGB 15.0   HCT 44.5   MCV 93        Recent Labs   Lab 10/07/20  1719   *   POTASSIUM 3.0*   CHLORIDE 97   CO2 23   ANIONGAP 12   *   BUN 20   CR 1.71*   GFRESTIMATED 32*   GFRESTBLACK 37*   JOSE 9.1   MAG 1.9   PROTTOTAL 8.0   ALBUMIN 4.4   BILITOTAL 1.0   ALKPHOS 101   AST 42   ALT 54*     Recent Labs   Lab 10/07/20  1902 10/07/20  1719   LACT 1.5 3.1*     Recent Labs   Lab 10/07/20  1719   LIPASE 164     Recent Labs   Lab 10/07/20  1801   COLOR Yellow   APPEARANCE Clear   URINEGLC Negative   URINEBILI Negative   URINEKETONE Negative   SG 1.016   UBLD Trace*   URINEPH 6.0   PROTEIN 70*   NITRITE Negative   LEUKEST Negative   RBCU <1   WBCU 1         Imaging:  Recent Results (from the past 24 hour(s))   CT Abdomen Pelvis w/o Contrast    Narrative    EXAM: CT ABDOMEN PELVIS W/O CONTRAST  LOCATION: [facility]  DATE/TIME: 10/7/2020 6:24 PM    INDICATION: Generalized abdominal pain.  COMPARISON: 04/09/2018.  TECHNIQUE: CT scan of the abdomen and pelvis was performed without IV contrast. Multiplanar reformats were obtained. Dose reduction techniques were used.  CONTRAST: None.    FINDINGS:   LOWER CHEST: Normal.    HEPATOBILIARY: Fatty liver. Cholecystectomy with stable dilatation of the common bile duct.    PANCREAS: Normal.    SPLEEN: Normal.    ADRENAL GLANDS: Normal.    KIDNEYS/BLADDER: Scarring mid left kidney.    BOWEL: Supraumbilical left-sided small incisional hernia containing a knuckle of small intestine. There is mild distention of multiple loops of small bowel which are both proximal and distal to the hernia suggesting this is not a site of obstruction   although there is mesenteric congestion  present. No clear transition and findings may be due to an enteritis and ileus although an early distal small bowel obstruction cannot be excluded.    LYMPH NODES: Normal.    VASCULATURE: Aortobifemoral bypass graft.    PELVIC ORGANS: Multiple calcified fibroids.    MUSCULOSKELETAL: Normal.      Impression    IMPRESSION:   1.  Diffuse distention of small bowel with mild wall thickening of some of the small bowel loops in lower abdomen with mesenteric congestion. Suspect ileus although cannot absolutely exclude a developing distal small bowel obstruction. No clear   transition. There is a supraumbilical left-sided incisional hernia containing a knuckle of small bowel although no definite transition is seen, there is mesenteric congestion at the hernia site.           Ceferino Rocha MD  Hospitalist  [facility]

## 2020-10-08 NOTE — PROVIDER NOTIFICATION
Patient had 3 bouts of diarrhea and per patient it was similar to when she had C Diff in the past,  should stool sample be collected?  Please advise,  Thanks     Her paged

## 2020-10-08 NOTE — PROGRESS NOTES
"[facility]  General Surgery Progress Note           Assessment and Plan:   Assessment:   -Abdominal pain with dilated loops of small bowel, possible bowel obstruction versus mesenteric ischemia  -s/p open aortoiliac bypass 2018, also known ventral hernia with reducible bowel  -pain improved, passing gas      Plan:   -IV fluids: NS with K @100cc/hr  -Pain management: none needed  -Diet advanced to sips of clears, await flatus to increase further  -Await return of bowel function, diet tolerance         Interval History:   Overall pain is much improved today, no abdominal pain and no cramping.  Feels slightly bloated and felt a bit of soreness when she bent forward to pick something up.  Feels hungry but not flatus yet.           Physical Exam:   Blood pressure 133/67, pulse 79, temperature 97.9  F (36.6  C), temperature source Oral, resp. rate 16, height 1.645 m (5' 4.76\"), weight 80.3 kg (177 lb 1.6 oz), SpO2 95 %, not currently breastfeeding.    No intake/output data recorded.    Constitutional:   awake, alert, cooperative, no apparent distress, and appears stated age     Abdomen:   Midline abdominal scar, hernia fullness supraumbilical to left side, +bowel sounds, abdomen slightly tender at mid to upper abdomen on left.  Hernia site non-tender, reducible.  Minimally distended.             Data:     Recent Labs   Lab 10/08/20  0637 10/07/20  1719   WBC 6.9 16.1*   HGB 12.2 15.0   HCT 36.6 44.5   MCV 95 93    236     Recent Labs   Lab 10/08/20  0637 10/07/20  2247 10/07/20  1719     --  132*   POTASSIUM 3.6 3.7 3.0*   CHLORIDE 105  --  97   CO2 27  --  23   ANIONGAP 6  --  12   *  --  121*   BUN 17  --  20   CR 1.42*  --  1.71*   GFRESTIMATED 39*  --  32*   GFRESTBLACK 46*  --  37*   JOSE 8.1*  --  9.1   MAG  --   --  1.9   PROTTOTAL  --   --  8.0   ALBUMIN  --   --  4.4   BILITOTAL  --   --  1.0   ALKPHOS  --   --  101   AST  --   --  42   ALT  --   --  54*     10/7/2020 CT ABDOMEN " PELVIS W/O CONTRAST  IMPRESSION:   1.  Diffuse distention of small bowel with mild wall thickening of some of the small bowel loops in lower abdomen with mesenteric congestion. Suspect ileus although cannot absolutely exclude a developing distal small bowel obstruction. No clear transition. There is a supraumbilical left-sided incisional hernia containing a knuckle of small bowel although no definite transition is seen, there is mesenteric congestion at the hernia site.    Roberta Acosta PA-C     Passed some gas, denies pain. Abdomen nontender  Clears, ADAT later today if tolerating  Heaven Painter MD

## 2020-10-08 NOTE — CONSULTS
General Surgery Consultation    Isa Eller MRN# [id number]   Age: 59 year old YOB: 1960     Date of Admission:  10/7/2020    Reason for consult:            Abdominal pain       Requesting physician:            Ceferino Rocha MD                Assessment and Plan:   Assessment:   Isa Eller is a 59 year old female with history of HTN, PVD s/p open aortoiliac bypass 2018, now admitted with abdominal, pain dilated loops of bowel, possible small bowel obstruction.  She has a known small ventral hernia with an easily reducible loop of bowel so that does not appear to be the cause of her pain. Other etiologies could be mechanical obstruction due to adhesions versus mesenteric ischemia.   Unfortunately CT abd was done without contrast due to ROCIO so we cannot evaluate the mesenteric vasculature on this image. Fortunately currently she is denying pain and has stable vitals without fever and resolved lactic acidosis so my suspicion for ischemic bowel is low.    Comorbidities:   has a past medical history of Anxiety, Clotting disorder (H), Hyperlipemia, Hypertension, and PAD (peripheral artery disease) (H).      Plan:   Continue conservative management with bowel rest, npo, IVF.  If ongoing pain, worsening vitals or fever,  repeat CT with IV contrast to evaluate for mesenteric ischemia after fluid bolus.  Surgical intervention may be needed if the clinical picture worsens or if the patient fails to progress with conservative measure.               Chief Complaint:   Abdominal pain    History is obtained from the patient         History of Present Illness:   Isa Eller is a 59 year old  female who presents with abdominal pain diffusely which started earlier today.  The pain is intermittent and cramping. She states it comes in waves. It was very severe earlier today so she presented to the ED. Since arrival her pain has improved and it is only slightly sore when she pushes on her abdomen.  States her hernia has not changed.  Was nauseated and vomited earlier today, nonbilious, nonbloody. Had a normal stool this morning. Can't remember last flatus but doesn't think she has passed gas today. No symptoms like this in the past. Last surgery on her abdomen in 2018 aortoiliac bypass graft. Now she feels much better without really much pain or nausea.         Past Medical History:     Past Medical History:   Diagnosis Date     Anxiety      Clotting disorder (H)      Hyperlipemia      Hypertension      PAD (peripheral artery disease) (H)              Past Surgical History:     Past Surgical History:   Procedure Laterality Date     BREAST SURGERY       BYPASS GRAFT AORTOILIAC  2018     LAPAROSCOPIC CHOLECYSTECTOMY WITH CHOLANGIOGRAMS N/A 10/19/2014    Procedure: LAPAROSCOPIC CHOLECYSTECTOMY WITH CHOLANGIOGRAMS;  Surgeon: Seamus Holman MD;  Location: [facility] OR     VASCULAR SURGERY               Social History:     Social History     Tobacco Use     Smoking status: Former Smoker     Smokeless tobacco: Never Used     Tobacco comment: Vapes   Substance Use Topics     Alcohol use: Yes             Family History:     Family History   Problem Relation Age of Onset     Hypertension Mother      Heart Disease Father      Hypertension Father             Allergies:   No Known Allergies          Medications:   No current facility-administered medications on file prior to encounter.        ASPIRIN EC PO, Take 81 mg by mouth daily        losartan-hydrochlorothiazide (HYZAAR) 50-12.5 MG tablet, Take 1 tablet by mouth daily       Metoprolol Succinate (TOPROL XL PO), Take 100 mg by mouth daily       omeprazole (PRILOSEC) 40 MG capsule, Take 1 capsule (40 mg) by mouth daily Take 30-60 minutes before a meal.       rosuvastatin (CRESTOR) 40 MG tablet, Take 40 mg by mouth every evening       VITAMIN D, CHOLECALCIFEROL, PO, Take 50,000 Units by mouth twice a week On Sunday and Wednesday.       clonazePAM (KLONOPIN) 1 MG tablet,  "Take 1 mg by mouth 2 times daily as needed for anxiety         metoprolol  5 mg Intravenous Q6H     pantoprazole (PROTONIX) IV  40 mg Intravenous At Bedtime     sodium chloride (PF)  3 mL Intracatheter Q8H            Review of Systems:   The 10 point review of systems is negative other than noted in the HPI.          Physical Exam:   BP (!) 147/60   Pulse 84   Temp 96.8  F (36  C) (Oral)   Resp 18   Ht 1.645 m (5' 4.76\")   Wt 80.3 kg (177 lb 1.6 oz)   SpO2 97%   BMI 29.69 kg/m    General - Well developed, well nourished female in no apparent distress  Eyes:  no scleral icterus or redness  Lungs:breathing comfortably on RA  Heart: regular pulse bilateral DP  Abdomen:soft, rounded, non-distended with moderate tenderness noted in the epigastric region.  No rebound or guarding. No diffuse peritoneal signs. Hernia in the supraumbilical midline easily reducible soft and nontender  MSK: Extremities warm without edema  Neurologic: nonfocal  Psychiatric: Mood and affect appropriate  Skin: Without lesions, rashes, or jaundice         Data:   Labs reviewed  Significant for hypoNa, hypoK, ROCIO with Cr 1.7.   Lactate 3.1->1.5  WBC 16.1    Imaging:  All imaging studies reviewed by me and my interpretation of the CT is: noncontrast exam shows diffusely mildly dilated small bowel loops with gradual transition near the terminal ileum.       Heaven Painter MD  10/7/2020 10:41 PM     Time spent with the patient, reviewing the EMR, reviewing laboratory and imaging studies, more than 50% of which was counseling and coordinating care:  30 minutes.           "

## 2020-10-08 NOTE — PROGRESS NOTES
[facility]             Hospitalist Progress Note    Name: Isa Eller    MRN: [id number]  YOB: 1960    Age: 59 year old  Date of admission: 10/7/2020  Primary care provider: Amena Stearns      Reason for Stay (Diagnosis): Possible small bowel obstruction         Assessment and Plan:      Summary of Stay:  Patient presented with sudden onset right upper and left upper quadrant abdominal pain that is intermittent and severe described as a severe cramp.  Pain only lasts a minute, but is coming back very frequently.  Having nausea and vomited twice.  She did have a normal bowel movement this morning before pain onset.  CT the abdomen pelvis shows dilated loops of small bowel and an incisional hernia with bowel in this area although the dilated loops are on both sides of the seems less likely the transition point.  She has very active bowel sounds on exam so I doubt ileus and much more suspect a mechanical SBO, likely from adhesions from previous abdominal surgeries.  Initial lactic acid was elevated, now normalized after 1 L normal saline. Suspect this is more from hypovolemia given she also has ROCIO and hyponatremia.     Problem List/Plan:    Possible SBO:    General surgery input appreciated.  Suspect possible small bowel obstruction.  Pain otherwise controlled right now with no nausea.  - Trial of sips of clears but did instruct patient to proceed cautiously  -IV fluids until p.o. intake is improved  -Supportive cares with pain control and antiemetics     ROCIO, hyponatremia:   Creatinine 1.7 with baseline 1.0 consistent with ROCIO secondary to prerenal azotemia from vomiting due to small bowel obstruction. Also on losartan-HCTZ.  Received 1 L NS in the ED. creatinine improving with IV fluid resuscitation.  -Continue to hold losartan-HCTZ  -IV NS with 20 meq K at 100 ml/hr  -Continue to monitor creatinine and electrolytes     Hypokalemia: Potassium 3.0.  Suspect in part due to  "vomiting and also the HCTZ she is on at baseline which will be held initially.  Received 10 mEq IV K x 2 in ED.   now resolved.  -Hold hydrochlorothiazide  -Continue to monitor     History of PAD, HTN, HLD: History of bilateral iliac stenting in 2006 followed by aorto-iliac bypass in 2018.  Previously on anticoagulation, but no longer.  Does take aspirin 81 mg daily along with rosuvastatin 40 mg daily, metoprolol succinate 100 g daily, and losartan-HCTZ 50-12.5 mg daily.  Takes her medications at night with last dose last night.  SBP 140s in ED.  -Hold PTA oral regimen initially until improving including her aspirin, but should be restarted soon.  -Metoprolol IV 5 mg every 6 hours  -IV hydralazine for severely elevated blood pressure     Anxiety: PTA on clonazepam 1 mg twice daily as needed, rarely uses.  -IV lorazepam 0.5 mg every 6 hours as needed     Leukocytosis: WBC 16.1.  I suspect this is stress demargination from severe pain.  No fevers or chills.  No other infectious symptoms other than she vomited.  Now resolved.      DVT Prophylaxis: Pneumatic Compression Devices  Code Status: Full Code  Discharge Dispo: Back to usual prior living arrangement  Estimated Disch Date / # of Days until Disch: In 2 to 3 days once small bowel obstruction resolves    Time spent: Greater than 35 minutes        Interval History (Subjective):      Reports 1 small flatus earlier this morning.  Denies any nausea.  Pain is otherwise controlled.         Physical Exam:      Vital signs:  Temp: 97.9  F (36.6  C) Temp src: Oral BP: (!) 157/56 Pulse: 75   Resp: 16 SpO2: 95 % O2 Device: None (Room air)   Height: 164.5 cm (5' 4.76\") Weight: 80.3 kg (177 lb 1.6 oz)  Estimated body mass index is 29.69 kg/m  as calculated from the following:    Height as of this encounter: 1.645 m (5' 4.76\").    Weight as of this encounter: 80.3 kg (177 lb 1.6 oz).    No intake/output data recorded.  Vitals:    10/07/20 1646 10/07/20 2117   Weight: 81.6 kg (180 " lb) 80.3 kg (177 lb 1.6 oz)       Constitutional: Awake, alert, cooperative, no apparent distress     Respiratory: Nl work of breathing. Clear to auscultation bilaterally, no crackles or wheezing   Cardiovascular: Regular rate and rhythm, normal S1 and S2, and no murmur noted   Abdomen: Normal bowel sounds, soft, mildly distended and tympanitic.   Skin: No rashes, no cyanosis, dry to touch   Neuro: CN 2-12 intact, no localizing weakness   Extremities: No edema, normal range of motion   HEENT Normocephalic, atraumatic, normal nasal turbinates; oropharynx clear   Neck Supple; nl inspection; trachea midline; no thryomegaly   Psychiatric: A+O x3. Normal affect          Medications:      All current medications were reviewed with changes reflected in problem list.         Data:      All new lab and imaging data was reviewed.   Labs:  No results for input(s): CULT in the last 168 hours.  Recent Labs   Lab 10/08/20  0637 10/07/20  1719   WBC 6.9 16.1*   HGB 12.2 15.0   HCT 36.6 44.5   MCV 95 93    236     Recent Labs   Lab 10/08/20  0637 10/07/20  2247 10/07/20  1719     --  132*   POTASSIUM 3.6 3.7 3.0*   CHLORIDE 105  --  97   CO2 27  --  23   ANIONGAP 6  --  12   *  --  121*   BUN 17  --  20   CR 1.42*  --  1.71*   GFRESTIMATED 39*  --  32*   GFRESTBLACK 46*  --  37*   JOSE 8.1*  --  9.1   MAG  --   --  1.9   PROTTOTAL  --   --  8.0   ALBUMIN  --   --  4.4   BILITOTAL  --   --  1.0   ALKPHOS  --   --  101   AST  --   --  42   ALT  --   --  54*      Imaging:   Recent Results (from the past 24 hour(s))   CT Abdomen Pelvis w/o Contrast    Narrative    EXAM: CT ABDOMEN PELVIS W/O CONTRAST  LOCATION: [facility]  DATE/TIME: 10/7/2020 6:24 PM    INDICATION: Generalized abdominal pain.  COMPARISON: 04/09/2018.  TECHNIQUE: CT scan of the abdomen and pelvis was performed without IV contrast. Multiplanar reformats were obtained. Dose reduction techniques were used.  CONTRAST: None.    FINDINGS:    LOWER CHEST: Normal.    HEPATOBILIARY: Fatty liver. Cholecystectomy with stable dilatation of the common bile duct.    PANCREAS: Normal.    SPLEEN: Normal.    ADRENAL GLANDS: Normal.    KIDNEYS/BLADDER: Scarring mid left kidney.    BOWEL: Supraumbilical left-sided small incisional hernia containing a knuckle of small intestine. There is mild distention of multiple loops of small bowel which are both proximal and distal to the hernia suggesting this is not a site of obstruction   although there is mesenteric congestion present. No clear transition and findings may be due to an enteritis and ileus although an early distal small bowel obstruction cannot be excluded.    LYMPH NODES: Normal.    VASCULATURE: Aortobifemoral bypass graft.    PELVIC ORGANS: Multiple calcified fibroids.    MUSCULOSKELETAL: Normal.      Impression    IMPRESSION:   1.  Diffuse distention of small bowel with mild wall thickening of some of the small bowel loops in lower abdomen with mesenteric congestion. Suspect ileus although cannot absolutely exclude a developing distal small bowel obstruction. No clear   transition. There is a supraumbilical left-sided incisional hernia containing a knuckle of small bowel although no definite transition is seen, there is mesenteric congestion at the hernia site.           Zak Smith -[phone]

## 2020-10-08 NOTE — ED NOTES
[facility]  ED Nurse Handoff Report    Isa Eller is a 59 year old female   ED Chief complaint: Abdominal Pain, Vomiting, and Diarrhea  . ED Diagnosis:   Final diagnoses:   None     Allergies: No Known Allergies    Code Status: Full Code  Activity level - Baseline/Home:  Independent. Activity Level - Current:   Independent. Lift room needed: No. Bariatric: No   Needed: No   Isolation: No. Infection: Not Applicable.     Vital Signs:   Vitals:    10/07/20 1805 10/07/20 1810 10/07/20 1815 10/07/20 1820   BP:       Pulse:       Temp:       TempSrc:       SpO2: 98% 95% 96% 97%   Weight:           Cardiac Rhythm:  ,      Pain level: 0, given morphine at 1743 for pain level of 10  Patient confused: No. Patient Falls Risk: No.   Elimination Status: Has voided   Patient Report - Initial Complaint: abdominal pain, vomiting. Focused Assessment: Gastrointestinal - Gastrointestinal WDL: all (Pt states abdominal pain started this morning) Abdominal Palpation: All Quadrants  Nausea/Vomiting Signs/Symptoms: nausea intermittent  All Quadrants Abdominal Palpation: tender; guarding (Tender in both upper quadrants) Last Bowel Movement: 10/07/20  GI Signs/Symptoms: abdominal discomfort (Pt states pain is really bad with palpation. Pain is a cramping pain and it comes and goes. Pt denies any pain in the back or flank. Pt had gallbladder removed. No hx of SBO.)   Tests Performed: labs, abdominal ct. Abnormal Results:   Labs Ordered and Resulted from Time of ED Arrival Up to the Time of Departure from the ED   CBC WITH PLATELETS DIFFERENTIAL - Abnormal; Notable for the following components:       Result Value    WBC 16.1 (*)     Absolute Neutrophil 13.7 (*)     All other components within normal limits   COMPREHENSIVE METABOLIC PANEL - Abnormal; Notable for the following components:    Sodium 132 (*)     Potassium 3.0 (*)     Glucose 121 (*)     Creatinine 1.71 (*)     GFR Estimate 32 (*)     GFR Estimate If  Black 37 (*)     ALT 54 (*)     All other components within normal limits   LACTIC ACID WHOLE BLOOD - Abnormal; Notable for the following components:    Lactic Acid 3.1 (*)     All other components within normal limits   ROUTINE UA WITH MICROSCOPIC - Abnormal; Notable for the following components:    Blood Urine Trace (*)     Protein Albumin Urine 70 (*)     Squamous Epithelial /HPF Urine 2 (*)     Mucous Urine Present (*)     Hyaline Casts 34 (*)     All other components within normal limits   LIPASE   LACTIC ACID WHOLE BLOOD   MAGNESIUM   COVID-19 VIRUS (CORONAVIRUS) BY PCR   PERIPHERAL IV CATHETER   PULSE OXIMETRY NURSING     CT Abdomen Pelvis w/o Contrast   Final Result   IMPRESSION:    1.  Diffuse distention of small bowel with mild wall thickening of some of the small bowel loops in lower abdomen with mesenteric congestion. Suspect ileus although cannot absolutely exclude a developing distal small bowel obstruction. No clear    transition. There is a supraumbilical left-sided incisional hernia containing a knuckle of small bowel although no definite transition is seen, there is mesenteric congestion at the hernia site.           .   Treatments provided: see MAR  Family Comments: N/A  OBS brochure/video discussed/provided to patient:  No  ED Medications:   Medications   potassium chloride 10 mEq in 100 mL intermittent infusion with 10 mg lidocaine (has no administration in time range)   potassium chloride 10 mEq in 100 mL intermittent infusion with 10 mg lidocaine (10 mEq Intravenous New Bag 10/7/20 1859)   0.9% sodium chloride BOLUS (0 mLs Intravenous Stopped 10/7/20 1904)   ondansetron (ZOFRAN) injection 4 mg (4 mg Intravenous Given 10/7/20 1744)   morphine (PF) injection 4 mg (4 mg Intravenous Given 10/7/20 1743)     Drips infusing:  Yes - Potassium  For the majority of the shift, the patient's behavior Green. Interventions performed were N/A.    Sepsis treatment initiated: No     Patient tested for COVID 19  prior to admission: YES    ED Nurse Name/Phone Number: Lexi Jack RN,   7:11 PM    RECEIVING UNIT ED HANDOFF REVIEW    Above ED Nurse Handoff Report was reviewed: Yes  Reviewed by: Julia Barnes on October 7, 2020 at 8:29 PM

## 2020-10-08 NOTE — PLAN OF CARE
"End of Shift Summary  For vital signs and complete assessments, please see documentation flowsheets.     Pertinent assessments: AOx4,  up Indep, VSS ex hypertension, denies abdominal pain/nausea, c/o \"occasional cramp.\" Started on IVF with KCL20 for K replacement. NPO ex ice chips, no meds. Gave prn Ativan at bedtime for inability to sleep.   Major Shift Events: Admitted to room #541, oriented to room and unit, prn Ativan, bowel rest.   Treatment Plan: General surgery consult, bowel rest, NPO, replace potassium.   Bedside Nurse: Julia Barnes RN.   "

## 2020-10-09 LAB
ANION GAP SERPL CALCULATED.3IONS-SCNC: 7 MMOL/L (ref 3–14)
BUN SERPL-MCNC: 10 MG/DL (ref 7–30)
CALCIUM SERPL-MCNC: 8.1 MG/DL (ref 8.5–10.1)
CHLORIDE SERPL-SCNC: 106 MMOL/L (ref 94–109)
CO2 SERPL-SCNC: 23 MMOL/L (ref 20–32)
CREAT SERPL-MCNC: 1.26 MG/DL (ref 0.52–1.04)
ERYTHROCYTE [DISTWIDTH] IN BLOOD BY AUTOMATED COUNT: 12.7 % (ref 10–15)
GFR SERPL CREATININE-BSD FRML MDRD: 46 ML/MIN/{1.73_M2}
GLUCOSE SERPL-MCNC: 102 MG/DL (ref 70–99)
HCT VFR BLD AUTO: 36.3 % (ref 35–47)
HGB BLD-MCNC: 11.8 G/DL (ref 11.7–15.7)
MCH RBC QN AUTO: 31.3 PG (ref 26.5–33)
MCHC RBC AUTO-ENTMCNC: 32.5 G/DL (ref 31.5–36.5)
MCV RBC AUTO: 96 FL (ref 78–100)
PLATELET # BLD AUTO: 157 10E9/L (ref 150–450)
POTASSIUM SERPL-SCNC: 3.7 MMOL/L (ref 3.4–5.3)
RBC # BLD AUTO: 3.77 10E12/L (ref 3.8–5.2)
SODIUM SERPL-SCNC: 136 MMOL/L (ref 133–144)
WBC # BLD AUTO: 5.1 10E9/L (ref 4–11)

## 2020-10-09 PROCEDURE — C9113 INJ PANTOPRAZOLE SODIUM, VIA: HCPCS | Performed by: INTERNAL MEDICINE

## 2020-10-09 PROCEDURE — 250N000011 HC RX IP 250 OP 636: Performed by: INTERNAL MEDICINE

## 2020-10-09 PROCEDURE — 80048 BASIC METABOLIC PNL TOTAL CA: CPT | Performed by: INTERNAL MEDICINE

## 2020-10-09 PROCEDURE — 99231 SBSQ HOSP IP/OBS SF/LOW 25: CPT | Performed by: SURGERY

## 2020-10-09 PROCEDURE — 36415 COLL VENOUS BLD VENIPUNCTURE: CPT | Performed by: INTERNAL MEDICINE

## 2020-10-09 PROCEDURE — 250N000009 HC RX 250: Performed by: INTERNAL MEDICINE

## 2020-10-09 PROCEDURE — 85027 COMPLETE CBC AUTOMATED: CPT | Performed by: INTERNAL MEDICINE

## 2020-10-09 PROCEDURE — 99232 SBSQ HOSP IP/OBS MODERATE 35: CPT | Performed by: INTERNAL MEDICINE

## 2020-10-09 PROCEDURE — 120N000001 HC R&B MED SURG/OB

## 2020-10-09 RX ADMIN — METOPROLOL TARTRATE 5 MG: 5 INJECTION INTRAVENOUS at 16:03

## 2020-10-09 RX ADMIN — METOPROLOL TARTRATE 5 MG: 5 INJECTION INTRAVENOUS at 10:26

## 2020-10-09 RX ADMIN — PANTOPRAZOLE SODIUM 40 MG: 40 INJECTION, POWDER, FOR SOLUTION INTRAVENOUS at 21:51

## 2020-10-09 RX ADMIN — METOPROLOL TARTRATE 5 MG: 5 INJECTION INTRAVENOUS at 21:33

## 2020-10-09 RX ADMIN — LORAZEPAM 0.5 MG: 2 INJECTION INTRAMUSCULAR; INTRAVENOUS at 22:58

## 2020-10-09 RX ADMIN — METOPROLOL TARTRATE 5 MG: 5 INJECTION INTRAVENOUS at 04:11

## 2020-10-09 RX ADMIN — MORPHINE SULFATE 2 MG: 2 INJECTION, SOLUTION INTRAMUSCULAR; INTRAVENOUS at 21:47

## 2020-10-09 ASSESSMENT — ACTIVITIES OF DAILY LIVING (ADL)
ADLS_ACUITY_SCORE: 11

## 2020-10-09 NOTE — PROGRESS NOTES
"[facility]  General Surgery Progress Note           Assessment and Plan:   Assessment:   -Abdominal pain with dilated loops of small bowel, possible bowel obstruction versus mesenteric ischemia  -s/p open aortoiliac bypass 2018, also known ventral hernia with reducible bowel  -pain improved, passing gas      Plan:   -IV fluids: NS with K @100cc/hr  -Pain management: none needed  -Diet: full liquids, small meals  -Await return of bowel function, diet tolerance         Interval History:   Slow improvement, occasional cramping across upper abd -much better than earlier in admission.  Passing flatus and loose stools.  Tolerating clears.  Discussed increase to full liquids, small meals.  In retrospect, she notes having eaten corn the night before onset of symptoms.  Discussed omitting this from her diet in the future.   Lots of questions about her hernia, whether this is a long-term issue, risk, whether to have repaired, repair options, etc.  Surgeon will discuss more with patient.         Physical Exam:   Blood pressure 138/58, pulse 72, temperature 98.2  F (36.8  C), temperature source Oral, resp. rate 16, height 1.645 m (5' 4.76\"), weight 80.3 kg (177 lb 1.6 oz), SpO2 95 %, not currently breastfeeding.    I/O last 3 completed shifts:  In: 3615 [P.O.:720; I.V.:2895]  Out: -     Constitutional:   awake, alert, cooperative, no apparent distress, and appears stated age     Abdomen:   Midline abdominal scar, hernia fullness supraumbilical to left side-slightly tender today, diffuse tenderness across upper abd, +bowel sounds, non-distended.             Data:     Recent Labs   Lab 10/09/20  0759 10/08/20  0637 10/07/20  1719   WBC 5.1 6.9 16.1*   HGB 11.8 12.2 15.0   HCT 36.3 36.6 44.5   MCV 96 95 93    174 236     Recent Labs   Lab 10/09/20  0759 10/08/20  0637 10/07/20  2247 10/07/20  1719    138  --  132*   POTASSIUM 3.7 3.6 3.7 3.0*   CHLORIDE 106 105  --  97   CO2 23 27  --  23   ANIONGAP 7 6  " --  12   * 108*  --  121*   BUN 10 17  --  20   CR 1.26* 1.42*  --  1.71*   GFRESTIMATED 46* 39*  --  32*   GFRESTBLACK 53* 46*  --  37*   JOSE 8.1* 8.1*  --  9.1   MAG  --   --   --  1.9   PROTTOTAL  --   --   --  8.0   ALBUMIN  --   --   --  4.4   BILITOTAL  --   --   --  1.0   ALKPHOS  --   --   --  101   AST  --   --   --  42   ALT  --   --   --  54*     10/7/2020 CT ABDOMEN PELVIS W/O CONTRAST  IMPRESSION:   1.  Diffuse distention of small bowel with mild wall thickening of some of the small bowel loops in lower abdomen with mesenteric congestion. Suspect ileus although cannot absolutely exclude a developing distal small bowel obstruction. No clear transition. There is a supraumbilical left-sided incisional hernia containing a knuckle of small bowel although no definite transition is seen, there is mesenteric congestion at the hernia site.    Roberta Acosta PA-C     Pt seen and examined, agree with above. She is feeling better, abd is slightly distended, hernia non-tender and reducible. Continue full liquids for now, hopefully home over the weekend.

## 2020-10-09 NOTE — PLAN OF CARE
To Do:  End of Shift Summary  For vital signs and complete assessments, please see documentation flowsheets.     Pertinent assessments: AAOx4, up independently, frequent loose stools, afebrile, pain abd upper quadrant, sharp cramping pain, tolerating full liquid diet.   Major Shift Events: upgrade to fulls  Treatment Plan: IVF, monitor intake, pain control.  Bedside Nurse: Lucy Pitts RN

## 2020-10-09 NOTE — PROGRESS NOTES
[facility]             Hospitalist Progress Note    Name: Isa Eller    MRN: [id number]  YOB: 1960    Age: 59 year old  Date of admission: 10/7/2020  Primary care provider: Amena Stearns      Reason for Stay (Diagnosis): Possible small bowel obstruction         Assessment and Plan:      Summary of Stay:  Patient presented with sudden onset right upper and left upper quadrant abdominal pain that is intermittent and severe described as a severe cramp.  Pain only lasts a minute, but is coming back very frequently.  Having nausea and vomited twice.  She did have a normal bowel movement this morning before pain onset.  CT the abdomen pelvis shows dilated loops of small bowel and an incisional hernia with bowel in this area although the dilated loops are on both sides of the seems less likely the transition point.  She has very active bowel sounds on exam so I doubt ileus and much more suspect a mechanical SBO, likely from adhesions from previous abdominal surgeries.  Initial lactic acid was elevated, now normalized after 1 L normal saline. Suspect this is more from hypovolemia given she also has ROCIO and hyponatremia.     Problem List/Plan:    Possible SBO:    General surgery input appreciated.  Suspect possible small bowel obstruction but now having diarrhea and loose stools which is somewhat of patient's normal.  C. difficile toxin negative.  Pain otherwise controlled right now with no nausea.  - Advancing to full liquid diet  -IV fluids until p.o. intake is improved  -Supportive cares with pain control and antiemetics     ROCIO, hyponatremia:   Creatinine 1.7 with baseline 1.0 consistent with ROCIO secondary to prerenal azotemia from vomiting due to small bowel obstruction. Also on losartan-HCTZ.  Received 1 L NS in the ED. creatinine improving with IV fluid resuscitation.  -Continue to hold losartan-HCTZ  -IV NS with 20 meq K at 100 ml/hr  -Continue to monitor creatinine and  "electrolytes     Hypokalemia: Potassium 3.0.  Suspect in part due to vomiting and also the HCTZ she is on at baseline which will be held initially.  Received 10 mEq IV K x 2 in ED.   now resolved.  -Hold hydrochlorothiazide  -Continue to monitor     History of PAD, HTN, HLD: History of bilateral iliac stenting in 2006 followed by aorto-iliac bypass in 2018.  Previously on anticoagulation, but no longer.  Does take aspirin 81 mg daily along with rosuvastatin 40 mg daily, metoprolol succinate 100 g daily, and losartan-HCTZ 50-12.5 mg daily.  Takes her medications at night with last dose last night.  SBP 140s in ED.  -Hold PTA oral regimen initially until improving including her aspirin, but should be restarted soon.  -Metoprolol IV 5 mg every 6 hours  -IV hydralazine for severely elevated blood pressure     Anxiety: PTA on clonazepam 1 mg twice daily as needed, rarely uses.  -IV lorazepam 0.5 mg every 6 hours as needed     Leukocytosis: WBC 16.1 on admission.  I suspect this is stress demargination from severe pain.  No fevers or chills.  No other infectious symptoms other than she vomited.  Now resolved.      DVT Prophylaxis: Pneumatic Compression Devices  Code Status: Full Code  Discharge Dispo: Back to usual prior living arrangement  Estimated Disch Date / # of Days until Disch: In 1-2 days once small bowel obstruction resolves    Time spent: Greater than 35 minutes        Interval History (Subjective):      Yesterday afternoon, patient did have significant loose bowel movements.  C. difficile toxin was sent and was negative.  Patient had one loose bowel movement today which is somewhat of her norm as she only \"have coffee only in the morning\".         Physical Exam:      Vital signs:  Temp: 98.2  F (36.8  C) Temp src: Oral BP: (!) 158/67 Pulse: 68   Resp: 16 SpO2: 95 % O2 Device: None (Room air)   Height: 164.5 cm (5' 4.76\") Weight: 80.3 kg (177 lb 1.6 oz)  Estimated body mass index is 29.69 kg/m  as calculated " "from the following:    Height as of this encounter: 1.645 m (5' 4.76\").    Weight as of this encounter: 80.3 kg (177 lb 1.6 oz).    I/O last 3 completed shifts:  In: 3615 [P.O.:720; I.V.:2895]  Out: -   Vitals:    10/07/20 1646 10/07/20 2117   Weight: 81.6 kg (180 lb) 80.3 kg (177 lb 1.6 oz)       Constitutional: Awake, alert, cooperative, no apparent distress     Respiratory: Nl work of breathing. Clear to auscultation bilaterally, no crackles or wheezing   Cardiovascular: Regular rate and rhythm, normal S1 and S2, and no murmur noted   Abdomen: Normal bowel sounds, soft, mildly distended and tympanitic.  Generalized tenderness with deep palpation but no rebound or guarding.   Skin: No rashes, no cyanosis, dry to touch   Neuro: CN 2-12 intact, no localizing weakness   Extremities: No edema, normal range of motion   HEENT Normocephalic, atraumatic, normal nasal turbinates; oropharynx clear   Neck Supple; nl inspection; trachea midline; no thryomegaly   Psychiatric: A+O x3. Normal affect          Medications:      All current medications were reviewed with changes reflected in problem list.         Data:      All new lab and imaging data was reviewed.   Labs:  No results for input(s): CULT in the last 168 hours.  Recent Labs   Lab 10/09/20  0759 10/08/20  0637 10/07/20  1719   WBC 5.1 6.9 16.1*   HGB 11.8 12.2 15.0   HCT 36.3 36.6 44.5   MCV 96 95 93    174 236     Recent Labs   Lab 10/09/20  0759 10/08/20  0637 10/07/20  2247 10/07/20  1719    138  --  132*   POTASSIUM 3.7 3.6 3.7 3.0*   CHLORIDE 106 105  --  97   CO2 23 27  --  23   ANIONGAP 7 6  --  12   * 108*  --  121*   BUN 10 17  --  20   CR 1.26* 1.42*  --  1.71*   GFRESTIMATED 46* 39*  --  32*   GFRESTBLACK 53* 46*  --  37*   JOSE 8.1* 8.1*  --  9.1   MAG  --   --   --  1.9   PROTTOTAL  --   --   --  8.0   ALBUMIN  --   --   --  4.4   BILITOTAL  --   --   --  1.0   ALKPHOS  --   --   --  101   AST  --   --   --  42   ALT  --   --   --  54* "      Imaging:   Recent Results (from the past 24 hour(s))   CT Abdomen Pelvis w/o Contrast    Narrative    EXAM: CT ABDOMEN PELVIS W/O CONTRAST  LOCATION: [facility]  DATE/TIME: 10/7/2020 6:24 PM    INDICATION: Generalized abdominal pain.  COMPARISON: 04/09/2018.  TECHNIQUE: CT scan of the abdomen and pelvis was performed without IV contrast. Multiplanar reformats were obtained. Dose reduction techniques were used.  CONTRAST: None.    FINDINGS:   LOWER CHEST: Normal.    HEPATOBILIARY: Fatty liver. Cholecystectomy with stable dilatation of the common bile duct.    PANCREAS: Normal.    SPLEEN: Normal.    ADRENAL GLANDS: Normal.    KIDNEYS/BLADDER: Scarring mid left kidney.    BOWEL: Supraumbilical left-sided small incisional hernia containing a knuckle of small intestine. There is mild distention of multiple loops of small bowel which are both proximal and distal to the hernia suggesting this is not a site of obstruction   although there is mesenteric congestion present. No clear transition and findings may be due to an enteritis and ileus although an early distal small bowel obstruction cannot be excluded.    LYMPH NODES: Normal.    VASCULATURE: Aortobifemoral bypass graft.    PELVIC ORGANS: Multiple calcified fibroids.    MUSCULOSKELETAL: Normal.      Impression    IMPRESSION:   1.  Diffuse distention of small bowel with mild wall thickening of some of the small bowel loops in lower abdomen with mesenteric congestion. Suspect ileus although cannot absolutely exclude a developing distal small bowel obstruction. No clear   transition. There is a supraumbilical left-sided incisional hernia containing a knuckle of small bowel although no definite transition is seen, there is mesenteric congestion at the hernia site.           Zak Smith -[phone]

## 2020-10-09 NOTE — PLAN OF CARE
"Pertinent assessments: AOx4,  up Indep, VSS ex hypertension, denies abdominal pain/nausea, c/o \"occasional cramp,\" onset of frequent loose stools.  IVF with KCL20 for K replacement. Upgraded to Clears, sips of water.   Major Shift Events: no stools overnight, C-Diff stool sample negative, IV Ativan at bedtime  Treatment Plan: General surgery following, bowel rest, clears to be tolerated, replace potassium.   Bedside Nurse: Julia Barnes RN.   "

## 2020-10-10 VITALS
DIASTOLIC BLOOD PRESSURE: 69 MMHG | OXYGEN SATURATION: 97 % | HEIGHT: 65 IN | RESPIRATION RATE: 16 BRPM | HEART RATE: 69 BPM | BODY MASS INDEX: 29.51 KG/M2 | SYSTOLIC BLOOD PRESSURE: 156 MMHG | WEIGHT: 177.1 LBS | TEMPERATURE: 97.6 F

## 2020-10-10 LAB
ANION GAP SERPL CALCULATED.3IONS-SCNC: 6 MMOL/L (ref 3–14)
BUN SERPL-MCNC: 8 MG/DL (ref 7–30)
CALCIUM SERPL-MCNC: 8.3 MG/DL (ref 8.5–10.1)
CHLORIDE SERPL-SCNC: 109 MMOL/L (ref 94–109)
CO2 SERPL-SCNC: 24 MMOL/L (ref 20–32)
CREAT SERPL-MCNC: 1.2 MG/DL (ref 0.52–1.04)
GFR SERPL CREATININE-BSD FRML MDRD: 48 ML/MIN/{1.73_M2}
GLUCOSE SERPL-MCNC: 98 MG/DL (ref 70–99)
POTASSIUM SERPL-SCNC: 4 MMOL/L (ref 3.4–5.3)
SODIUM SERPL-SCNC: 139 MMOL/L (ref 133–144)

## 2020-10-10 PROCEDURE — 250N000009 HC RX 250: Performed by: INTERNAL MEDICINE

## 2020-10-10 PROCEDURE — 99239 HOSP IP/OBS DSCHRG MGMT >30: CPT | Performed by: HOSPITALIST

## 2020-10-10 PROCEDURE — 80048 BASIC METABOLIC PNL TOTAL CA: CPT | Performed by: INTERNAL MEDICINE

## 2020-10-10 PROCEDURE — 36415 COLL VENOUS BLD VENIPUNCTURE: CPT | Performed by: INTERNAL MEDICINE

## 2020-10-10 RX ADMIN — METOPROLOL TARTRATE 5 MG: 5 INJECTION INTRAVENOUS at 03:42

## 2020-10-10 ASSESSMENT — ACTIVITIES OF DAILY LIVING (ADL)
ADLS_ACUITY_SCORE: 11

## 2020-10-10 NOTE — DISCHARGE SUMMARY
"Hospitalist Discharge Summary  [facility]    Isa Eller MRN# [id number]   YOB: 1960 Age: 59 year old     Date of Admission:  10/7/2020  Date of Discharge:  10/10/2020  Admitting Physician:  Ceferino Rocha MD  Discharge Physician:  Yordan Paredes DO  Discharging Service:  Hospitalist     Primary Provider: Amena Stearns          Discharge Diagnosis:   1.  Small bowel obstruction.   2.  Acute kidney injury.   3.  Hyponatremia.   4.  Hypokalemia.   5.  Peripheral arterial disease.   6.  Hypertension.   7.  Hyperlipidemia.   8.  Anxiety.   9.  Leukocytosis.              Discharge Disposition:   Discharged to home           Allergies:   No Known Allergies           Discharge Medications:   Current Discharge Medication List      CONTINUE these medications which have NOT CHANGED    Details   ASPIRIN EC PO Take 81 mg by mouth daily       losartan-hydrochlorothiazide (HYZAAR) 50-12.5 MG tablet Take 1 tablet by mouth daily      Metoprolol Succinate (TOPROL XL PO) Take 100 mg by mouth daily      omeprazole (PRILOSEC) 40 MG capsule Take 1 capsule (40 mg) by mouth daily Take 30-60 minutes before a meal.  Qty: 90 capsule, Refills: 1    Associated Diagnoses: Gastroesophageal reflux disease, esophagitis presence not specified      rosuvastatin (CRESTOR) 40 MG tablet Take 40 mg by mouth every evening      VITAMIN D, CHOLECALCIFEROL, PO Take 50,000 Units by mouth twice a week On Sunday and Wednesday.      clonazePAM (KLONOPIN) 1 MG tablet Take 1 mg by mouth 2 times daily as needed for anxiety                     Condition on Discharge:   Discharge condition: Good   Discharge vitals: Blood pressure (!) 156/69, pulse 69, temperature 97.6  F (36.4  C), temperature source Oral, resp. rate 16, height 1.645 m (5' 4.76\"), weight 80.3 kg (177 lb 1.6 oz), SpO2 97 %, not currently breastfeeding.   Code status on discharge: Full Code      BASIC PHYSICAL EXAMINATION:  GENERAL: No apparent " distress.  CARDIOVASCULAR: Regular rate and rhythm without murmurs.  PULMONARY: Clear to auscultation bilaterally.   GASTROINTESTINAL: Abdomen soft, non-tender.  EXTREMITIES: No edema, pulses intact.  NEUROLOGIC: No focal deficits.            History of Illness:   See detailed admission note for full details.               Procedures excluding imaging which is summarized below:   Please see details in the electronic medical record.           Consultations:   SURGERY GENERAL IP CONSULT          Significant Results:   Results for orders placed or performed during the hospital encounter of 10/07/20   CT Abdomen Pelvis w/o Contrast    Narrative    EXAM: CT ABDOMEN PELVIS W/O CONTRAST  LOCATION: [facility]  DATE/TIME: 10/7/2020 6:24 PM    INDICATION: Generalized abdominal pain.  COMPARISON: 04/09/2018.  TECHNIQUE: CT scan of the abdomen and pelvis was performed without IV contrast. Multiplanar reformats were obtained. Dose reduction techniques were used.  CONTRAST: None.    FINDINGS:   LOWER CHEST: Normal.    HEPATOBILIARY: Fatty liver. Cholecystectomy with stable dilatation of the common bile duct.    PANCREAS: Normal.    SPLEEN: Normal.    ADRENAL GLANDS: Normal.    KIDNEYS/BLADDER: Scarring mid left kidney.    BOWEL: Supraumbilical left-sided small incisional hernia containing a knuckle of small intestine. There is mild distention of multiple loops of small bowel which are both proximal and distal to the hernia suggesting this is not a site of obstruction   although there is mesenteric congestion present. No clear transition and findings may be due to an enteritis and ileus although an early distal small bowel obstruction cannot be excluded.    LYMPH NODES: Normal.    VASCULATURE: Aortobifemoral bypass graft.    PELVIC ORGANS: Multiple calcified fibroids.    MUSCULOSKELETAL: Normal.      Impression    IMPRESSION:   1.  Diffuse distention of small bowel with mild wall thickening of some of the small bowel  loops in lower abdomen with mesenteric congestion. Suspect ileus although cannot absolutely exclude a developing distal small bowel obstruction. No clear   transition. There is a supraumbilical left-sided incisional hernia containing a knuckle of small bowel although no definite transition is seen, there is mesenteric congestion at the hernia site.           Transthoracic Echocardiogram Results:  No results found for this or any previous visit (from the past 4320 hour(s)).             Pending Results:   Unresulted Labs Ordered in the Past 30 Days of this Admission     No orders found from 9/7/2020 to 10/8/2020.                      Discharge Instructions and Follow-Up:   Discharge instructions and follow-up:   Discharge Procedure Orders   Follow-up and recommended labs and tests    Order Comments: Follow up with primary care provider, Amena Stearns, within 7 days for hospital follow- up.  The following labs/tests are recommended: BMP.  Avoid NSAIDs.  Follow-up with surgery as needed.     Activity   Order Comments: Your activity upon discharge: activity as tolerated     Order Specific Question Answer Comments   Is discharge order? Yes      Full Code     Order Specific Question Answer Comments   Code status determined by: Discussion with patient/ legal decision maker      Diet   Order Comments: Follow this diet upon discharge: Orders Placed This Encounter      Low Fiber Diet     Order Specific Question Answer Comments   Is discharge order? Yes              Hospital Course:   This is a 59-year-old female with a history of hypertension, peripheral vascular disease, hyperlipidemia, and abdominal surgeries, presenting to [facility] for evaluation of abdominal pain.  CT showed incisional hernia with some small bowel in the hernia along with multiple dilated small bowel loops.  She was thought to have an SBO, so was admitted for surgical evaluation.  She does have multiple abdominal surgeries, so possibly she  does have an obstruction related to adhesive disease.  She was started on IV fluids and bowel rest.  Antiemetics and analgesics were available.  She began having bowel movements and abdominal pain resolved.  She is having occasional cramping sensation, but overall much improved.  Stool testing was performed and is negative for C. diff.  She is tolerating a low-fiber diet.  Surgical services cleared for discharge home with outpatient followup as needed.      She was also found to have acute kidney injury with hyponatremia.  She had a recent dental procedure and has been taking quite a bit of NSAIDs for pain management.  Her creatinine peaked at about 1.7 and was 1.2 on discharge following fluid resuscitation.  Prior to admission losartan and hydrochlorothiazide were placed on hold during the hospitalization, but given the improvement  she has had with her renal function, this can be resumed on discharge.  She should have a basic metabolic panel checked in 1 week.     The patient was seen, examined, and counseled on this day. The hospitalization and plan of care was reviewed with the patient extensively. All questions were addressed and the patient agreed to follow-up as noted above.      Total time spent in face to face contact with the patient and coordinating discharge was:  35 Minutes    Yordan Paredes DO MPH  [facility] Hospitalist  [address] [place] [address]  Pager: [phone]  10/10/2020        D: 10/10/2020   T: 10/10/2020   MT: LEONA      Name:     LAKESHA ALVARADO   MRN:      [id number]        Account:        [id number]   :      1960           Admit Date:     10/07/2020                                  Discharge Date:       Document: [id number]       cc: Amena Stearns MD

## 2020-10-10 NOTE — PLAN OF CARE
Patient verbalizes understanding of discharge instructions  IV removed tip intact  Information provided on low fiber diet  Ambulating to transportation without issue

## 2020-10-10 NOTE — PLAN OF CARE
End of Shift Summary  For vital signs and complete assessments, please see documentation flowsheets.     Pertinent assessments: Abd appears distended, reports feeling bloated, cramping pain, morphine given. Reports loose stool x1.   Major Shift Events: Pt reports feeling anxious, ativan given.   Treatment Plan: IVF, Protonix, iv, monitor intake & pain control.

## 2020-10-10 NOTE — PROGRESS NOTES
"[facility]  General Surgery Progress Note           Assessment and Plan:   Assessment:   -Abdominal pain with dilated loops of small bowel, possible bowel obstruction versus mesenteric ischemia  -known ventral hernia with reducible bowel  -pain improved, passing gas, loose BM's  -c diff negative      Plan:   -IV fluids: saline lock  -Pain management: none needed  -Diet: advance to low fiber  -possibly home today if tolerating low fiber diet from surgical perspective  -follow up with surgery prn to discuss hernia repair if desired         Interval History:   Sitting up in bed.  Feels better today, almost back to normal.  Does still get some tight cramping pain around umbilicus but states it is tolerable.  Tolerating diet with no nausea.  Passing gas.  Had several loose bowel movements day before.  Now having formed stools.          Physical Exam:   Blood pressure (!) 156/69, pulse 69, temperature 97.6  F (36.4  C), temperature source Oral, resp. rate 16, height 1.645 m (5' 4.76\"), weight 80.3 kg (177 lb 1.6 oz), SpO2 97 %, not currently breastfeeding.    I/O last 3 completed shifts:  In: 2611 [P.O.:1710; I.V.:901]  Out: -     Constitutional:   awake, alert, cooperative, no apparent distress, and appears stated age     Abdomen:   Midline abdominal scar, no tenderness to reducible hernia site. Abdomen is soft, non tender, non distended             Data:     Recent Labs   Lab 10/09/20  0759 10/08/20  0637 10/07/20  1719   WBC 5.1 6.9 16.1*   HGB 11.8 12.2 15.0   HCT 36.3 36.6 44.5   MCV 96 95 93    174 236     Recent Labs   Lab 10/10/20  0648 10/09/20  0759 10/08/20  0637 10/07/20  1719 10/07/20  1719    136 138  --  132*   POTASSIUM 4.0 3.7 3.6   < > 3.0*   CHLORIDE 109 106 105  --  97   CO2 24 23 27  --  23   ANIONGAP 6 7 6  --  12   GLC 98 102* 108*  --  121*   BUN 8 10 17  --  20   CR 1.20* 1.26* 1.42*  --  1.71*   GFRESTIMATED 48* 46* 39*  --  32*   GFRRENETTA 56* 53* 46*  --  37*   JOSE " 8.3* 8.1* 8.1*  --  9.1   MAG  --   --   --   --  1.9   PROTTOTAL  --   --   --   --  8.0   ALBUMIN  --   --   --   --  4.4   BILITOTAL  --   --   --   --  1.0   ALKPHOS  --   --   --   --  101   AST  --   --   --   --  42   ALT  --   --   --   --  54*    < > = values in this interval not displayed.         Mary Finley PA-C

## 2021-04-24 ENCOUNTER — HEALTH MAINTENANCE LETTER (OUTPATIENT)
Age: 61
End: 2021-04-24

## 2021-10-03 ENCOUNTER — HEALTH MAINTENANCE LETTER (OUTPATIENT)
Age: 61
End: 2021-10-03

## 2022-02-14 ENCOUNTER — APPOINTMENT (OUTPATIENT)
Dept: CT IMAGING | Facility: CLINIC | Age: 62
End: 2022-02-14
Attending: EMERGENCY MEDICINE
Payer: COMMERCIAL

## 2022-02-14 ENCOUNTER — HOSPITAL ENCOUNTER (OUTPATIENT)
Facility: CLINIC | Age: 62
Setting detail: OBSERVATION
Discharge: HOME | End: 2022-02-16
Attending: EMERGENCY MEDICINE | Admitting: INTERNAL MEDICINE
Payer: COMMERCIAL

## 2022-02-14 ENCOUNTER — APPOINTMENT (OUTPATIENT)
Dept: GENERAL RADIOLOGY | Facility: CLINIC | Age: 62
End: 2022-02-14
Attending: SURGERY
Payer: COMMERCIAL

## 2022-02-14 DIAGNOSIS — K56.609 SBO (SMALL BOWEL OBSTRUCTION) (H): ICD-10-CM

## 2022-02-14 LAB
ABO/RH(D): NORMAL
ANION GAP SERPL CALCULATED.3IONS-SCNC: 6 MMOL/L (ref 3–14)
ANTIBODY SCREEN: NEGATIVE
BASOPHILS # BLD AUTO: 0 10E3/UL (ref 0–0.2)
BASOPHILS NFR BLD AUTO: 1 %
BUN SERPL-MCNC: 7 MG/DL (ref 7–30)
CALCIUM SERPL-MCNC: 9.1 MG/DL (ref 8.5–10.1)
CHLORIDE BLD-SCNC: 107 MMOL/L (ref 94–109)
CO2 SERPL-SCNC: 23 MMOL/L (ref 20–32)
CREAT SERPL-MCNC: 1 MG/DL (ref 0.52–1.04)
EOSINOPHIL # BLD AUTO: 0.1 10E3/UL (ref 0–0.7)
EOSINOPHIL NFR BLD AUTO: 1 %
ERYTHROCYTE [DISTWIDTH] IN BLOOD BY AUTOMATED COUNT: 12.8 % (ref 10–15)
GFR SERPL CREATININE-BSD FRML MDRD: 63 ML/MIN/1.73M2
GLUCOSE BLD-MCNC: 117 MG/DL (ref 70–99)
HCT VFR BLD AUTO: 38.7 % (ref 35–47)
HGB BLD-MCNC: 13.1 G/DL (ref 11.7–15.7)
HOLD SPECIMEN: NORMAL
IMM GRANULOCYTES # BLD: 0.1 10E3/UL
IMM GRANULOCYTES NFR BLD: 1 %
INR PPP: 0.94 (ref 0.85–1.15)
LACTATE SERPL-SCNC: 1.1 MMOL/L (ref 0.7–2)
LYMPHOCYTES # BLD AUTO: 1.8 10E3/UL (ref 0.8–5.3)
LYMPHOCYTES NFR BLD AUTO: 23 %
MCH RBC QN AUTO: 31.6 PG (ref 26.5–33)
MCHC RBC AUTO-ENTMCNC: 33.9 G/DL (ref 31.5–36.5)
MCV RBC AUTO: 93 FL (ref 78–100)
MONOCYTES # BLD AUTO: 0.6 10E3/UL (ref 0–1.3)
MONOCYTES NFR BLD AUTO: 8 %
NEUTROPHILS # BLD AUTO: 5.2 10E3/UL (ref 1.6–8.3)
NEUTROPHILS NFR BLD AUTO: 66 %
NRBC # BLD AUTO: 0 10E3/UL
NRBC BLD AUTO-RTO: 0 /100
PLATELET # BLD AUTO: 226 10E3/UL (ref 150–450)
POTASSIUM BLD-SCNC: 3.9 MMOL/L (ref 3.4–5.3)
RBC # BLD AUTO: 4.15 10E6/UL (ref 3.8–5.2)
SARS-COV-2 RNA RESP QL NAA+PROBE: NEGATIVE
SODIUM SERPL-SCNC: 136 MMOL/L (ref 133–144)
SPECIMEN EXPIRATION DATE: NORMAL
WBC # BLD AUTO: 7.8 10E3/UL (ref 4–11)

## 2022-02-14 PROCEDURE — 85004 AUTOMATED DIFF WBC COUNT: CPT | Performed by: EMERGENCY MEDICINE

## 2022-02-14 PROCEDURE — 36415 COLL VENOUS BLD VENIPUNCTURE: CPT | Performed by: EMERGENCY MEDICINE

## 2022-02-14 PROCEDURE — 250N000011 HC RX IP 250 OP 636: Performed by: EMERGENCY MEDICINE

## 2022-02-14 PROCEDURE — 96374 THER/PROPH/DIAG INJ IV PUSH: CPT | Mod: 59

## 2022-02-14 PROCEDURE — 86850 RBC ANTIBODY SCREEN: CPT | Performed by: EMERGENCY MEDICINE

## 2022-02-14 PROCEDURE — 74177 CT ABD & PELVIS W/CONTRAST: CPT

## 2022-02-14 PROCEDURE — 250N000009 HC RX 250: Performed by: EMERGENCY MEDICINE

## 2022-02-14 PROCEDURE — 82310 ASSAY OF CALCIUM: CPT | Performed by: EMERGENCY MEDICINE

## 2022-02-14 PROCEDURE — 99285 EMERGENCY DEPT VISIT HI MDM: CPT | Mod: 25

## 2022-02-14 PROCEDURE — 99222 1ST HOSP IP/OBS MODERATE 55: CPT | Performed by: SURGERY

## 2022-02-14 PROCEDURE — C9803 HOPD COVID-19 SPEC COLLECT: HCPCS

## 2022-02-14 PROCEDURE — G0378 HOSPITAL OBSERVATION PER HR: HCPCS

## 2022-02-14 PROCEDURE — 96375 TX/PRO/DX INJ NEW DRUG ADDON: CPT

## 2022-02-14 PROCEDURE — 86901 BLOOD TYPING SEROLOGIC RH(D): CPT | Performed by: EMERGENCY MEDICINE

## 2022-02-14 PROCEDURE — 74018 RADEX ABDOMEN 1 VIEW: CPT

## 2022-02-14 PROCEDURE — 83605 ASSAY OF LACTIC ACID: CPT | Performed by: SURGERY

## 2022-02-14 PROCEDURE — 85610 PROTHROMBIN TIME: CPT | Performed by: EMERGENCY MEDICINE

## 2022-02-14 PROCEDURE — 87635 SARS-COV-2 COVID-19 AMP PRB: CPT | Performed by: EMERGENCY MEDICINE

## 2022-02-14 RX ORDER — LOSARTAN POTASSIUM 50 MG/1
50 TABLET ORAL DAILY
COMMUNITY
End: 2023-12-29

## 2022-02-14 RX ORDER — ERGOCALCIFEROL 1.25 MG/1
50000 CAPSULE, LIQUID FILLED ORAL
COMMUNITY
End: 2023-12-29

## 2022-02-14 RX ORDER — FENTANYL CITRATE 50 UG/ML
100 INJECTION, SOLUTION INTRAMUSCULAR; INTRAVENOUS ONCE
Status: COMPLETED | OUTPATIENT
Start: 2022-02-14 | End: 2022-02-14

## 2022-02-14 RX ORDER — HYDROMORPHONE HCL IN WATER/PF 6 MG/30 ML
0.2 PATIENT CONTROLLED ANALGESIA SYRINGE INTRAVENOUS
Status: DISCONTINUED | OUTPATIENT
Start: 2022-02-14 | End: 2022-02-16 | Stop reason: HOSPADM

## 2022-02-14 RX ORDER — IOPAMIDOL 755 MG/ML
500 INJECTION, SOLUTION INTRAVASCULAR ONCE
Status: COMPLETED | OUTPATIENT
Start: 2022-02-14 | End: 2022-02-14

## 2022-02-14 RX ORDER — MORPHINE SULFATE 4 MG/ML
4 INJECTION, SOLUTION INTRAMUSCULAR; INTRAVENOUS
Status: DISCONTINUED | OUTPATIENT
Start: 2022-02-14 | End: 2022-02-14

## 2022-02-14 RX ADMIN — DIATRIZOATE MEGLUMINE AND DIATRIZOATE SODIUM 75 ML: 660; 100 SOLUTION ORAL; RECTAL at 22:06

## 2022-02-14 RX ADMIN — SODIUM CHLORIDE 62 ML: 9 INJECTION, SOLUTION INTRAVENOUS at 20:08

## 2022-02-14 RX ADMIN — MORPHINE SULFATE 4 MG: 4 INJECTION INTRAVENOUS at 19:35

## 2022-02-14 RX ADMIN — IOPAMIDOL 89 ML: 755 INJECTION, SOLUTION INTRAVENOUS at 20:08

## 2022-02-14 RX ADMIN — FENTANYL CITRATE 100 MCG: 50 INJECTION, SOLUTION INTRAMUSCULAR; INTRAVENOUS at 21:08

## 2022-02-14 ASSESSMENT — ENCOUNTER SYMPTOMS
DIARRHEA: 1
VOMITING: 0
APPETITE CHANGE: 1
FEVER: 0
ABDOMINAL PAIN: 1
SHORTNESS OF BREATH: 0

## 2022-02-15 LAB
ALBUMIN SERPL-MCNC: 3.5 G/DL (ref 3.4–5)
ALP SERPL-CCNC: 91 U/L (ref 40–150)
ALT SERPL W P-5'-P-CCNC: 55 U/L (ref 0–50)
ANION GAP SERPL CALCULATED.3IONS-SCNC: 6 MMOL/L (ref 3–14)
AST SERPL W P-5'-P-CCNC: 56 U/L (ref 0–45)
BILIRUB SERPL-MCNC: 0.7 MG/DL (ref 0.2–1.3)
BUN SERPL-MCNC: 8 MG/DL (ref 7–30)
CALCIUM SERPL-MCNC: 8.8 MG/DL (ref 8.5–10.1)
CHLORIDE BLD-SCNC: 108 MMOL/L (ref 94–109)
CO2 SERPL-SCNC: 25 MMOL/L (ref 20–32)
CREAT SERPL-MCNC: 1.06 MG/DL (ref 0.52–1.04)
ERYTHROCYTE [DISTWIDTH] IN BLOOD BY AUTOMATED COUNT: 12.9 % (ref 10–15)
GFR SERPL CREATININE-BSD FRML MDRD: 59 ML/MIN/1.73M2
GLUCOSE BLD-MCNC: 99 MG/DL (ref 70–99)
HCT VFR BLD AUTO: 37.6 % (ref 35–47)
HGB BLD-MCNC: 12.3 G/DL (ref 11.7–15.7)
MCH RBC QN AUTO: 31.2 PG (ref 26.5–33)
MCHC RBC AUTO-ENTMCNC: 32.7 G/DL (ref 31.5–36.5)
MCV RBC AUTO: 95 FL (ref 78–100)
PLATELET # BLD AUTO: 194 10E3/UL (ref 150–450)
POTASSIUM BLD-SCNC: 4.1 MMOL/L (ref 3.4–5.3)
PROT SERPL-MCNC: 6.8 G/DL (ref 6.8–8.8)
RBC # BLD AUTO: 3.94 10E6/UL (ref 3.8–5.2)
SODIUM SERPL-SCNC: 139 MMOL/L (ref 133–144)
WBC # BLD AUTO: 7.1 10E3/UL (ref 4–11)

## 2022-02-15 PROCEDURE — 96376 TX/PRO/DX INJ SAME DRUG ADON: CPT

## 2022-02-15 PROCEDURE — 36415 COLL VENOUS BLD VENIPUNCTURE: CPT | Performed by: PHYSICIAN ASSISTANT

## 2022-02-15 PROCEDURE — 96375 TX/PRO/DX INJ NEW DRUG ADDON: CPT

## 2022-02-15 PROCEDURE — G0378 HOSPITAL OBSERVATION PER HR: HCPCS

## 2022-02-15 PROCEDURE — 250N000011 HC RX IP 250 OP 636: Performed by: PHYSICIAN ASSISTANT

## 2022-02-15 PROCEDURE — 258N000003 HC RX IP 258 OP 636: Performed by: PHYSICIAN ASSISTANT

## 2022-02-15 PROCEDURE — 85027 COMPLETE CBC AUTOMATED: CPT | Performed by: PHYSICIAN ASSISTANT

## 2022-02-15 PROCEDURE — 96361 HYDRATE IV INFUSION ADD-ON: CPT

## 2022-02-15 PROCEDURE — 250N000009 HC RX 250: Performed by: PHYSICIAN ASSISTANT

## 2022-02-15 PROCEDURE — 250N000013 HC RX MED GY IP 250 OP 250 PS 637: Performed by: INTERNAL MEDICINE

## 2022-02-15 PROCEDURE — 80053 COMPREHEN METABOLIC PANEL: CPT | Performed by: PHYSICIAN ASSISTANT

## 2022-02-15 PROCEDURE — 99226 PR SUBSEQUENT OBSERVATION CARE,LEVEL III: CPT | Performed by: INTERNAL MEDICINE

## 2022-02-15 RX ORDER — METOPROLOL TARTRATE 1 MG/ML
5 INJECTION, SOLUTION INTRAVENOUS EVERY 6 HOURS
Status: DISCONTINUED | OUTPATIENT
Start: 2022-02-15 | End: 2022-02-15 | Stop reason: DRUGHIGH

## 2022-02-15 RX ORDER — PROCHLORPERAZINE MALEATE 5 MG
10 TABLET ORAL EVERY 6 HOURS PRN
Status: DISCONTINUED | OUTPATIENT
Start: 2022-02-15 | End: 2022-02-16 | Stop reason: HOSPADM

## 2022-02-15 RX ORDER — LORAZEPAM 2 MG/ML
.5-1 INJECTION INTRAMUSCULAR 2 TIMES DAILY PRN
Status: DISCONTINUED | OUTPATIENT
Start: 2022-02-15 | End: 2022-02-16 | Stop reason: HOSPADM

## 2022-02-15 RX ORDER — LIDOCAINE 40 MG/G
CREAM TOPICAL
Status: DISCONTINUED | OUTPATIENT
Start: 2022-02-15 | End: 2022-02-16 | Stop reason: HOSPADM

## 2022-02-15 RX ORDER — PROCHLORPERAZINE 25 MG
25 SUPPOSITORY, RECTAL RECTAL EVERY 12 HOURS PRN
Status: DISCONTINUED | OUTPATIENT
Start: 2022-02-15 | End: 2022-02-16 | Stop reason: HOSPADM

## 2022-02-15 RX ORDER — SODIUM CHLORIDE, SODIUM LACTATE, POTASSIUM CHLORIDE, CALCIUM CHLORIDE 600; 310; 30; 20 MG/100ML; MG/100ML; MG/100ML; MG/100ML
INJECTION, SOLUTION INTRAVENOUS CONTINUOUS
Status: DISCONTINUED | OUTPATIENT
Start: 2022-02-15 | End: 2022-02-16 | Stop reason: HOSPADM

## 2022-02-15 RX ORDER — METOPROLOL SUCCINATE 100 MG/1
100 TABLET, EXTENDED RELEASE ORAL DAILY
Status: DISCONTINUED | OUTPATIENT
Start: 2022-02-15 | End: 2022-02-16

## 2022-02-15 RX ORDER — ONDANSETRON 4 MG/1
4 TABLET, ORALLY DISINTEGRATING ORAL EVERY 6 HOURS PRN
Status: DISCONTINUED | OUTPATIENT
Start: 2022-02-15 | End: 2022-02-16 | Stop reason: HOSPADM

## 2022-02-15 RX ORDER — ONDANSETRON 2 MG/ML
4 INJECTION INTRAMUSCULAR; INTRAVENOUS EVERY 6 HOURS PRN
Status: DISCONTINUED | OUTPATIENT
Start: 2022-02-15 | End: 2022-02-16 | Stop reason: HOSPADM

## 2022-02-15 RX ADMIN — SODIUM CHLORIDE, POTASSIUM CHLORIDE, SODIUM LACTATE AND CALCIUM CHLORIDE: 600; 310; 30; 20 INJECTION, SOLUTION INTRAVENOUS at 01:31

## 2022-02-15 RX ADMIN — METOPROLOL TARTRATE 5 MG: 5 INJECTION INTRAVENOUS at 01:32

## 2022-02-15 RX ADMIN — HYDROMORPHONE HYDROCHLORIDE 0.2 MG: 0.2 INJECTION, SOLUTION INTRAMUSCULAR; INTRAVENOUS; SUBCUTANEOUS at 01:32

## 2022-02-15 RX ADMIN — METOPROLOL SUCCINATE 100 MG: 100 TABLET, EXTENDED RELEASE ORAL at 18:20

## 2022-02-15 RX ADMIN — HYDROMORPHONE HYDROCHLORIDE 0.2 MG: 0.2 INJECTION, SOLUTION INTRAMUSCULAR; INTRAVENOUS; SUBCUTANEOUS at 20:42

## 2022-02-15 RX ADMIN — SODIUM CHLORIDE, POTASSIUM CHLORIDE, SODIUM LACTATE AND CALCIUM CHLORIDE: 600; 310; 30; 20 INJECTION, SOLUTION INTRAVENOUS at 20:50

## 2022-02-15 RX ADMIN — METOPROLOL TARTRATE 5 MG: 5 INJECTION INTRAVENOUS at 06:51

## 2022-02-15 RX ADMIN — SODIUM CHLORIDE, POTASSIUM CHLORIDE, SODIUM LACTATE AND CALCIUM CHLORIDE: 600; 310; 30; 20 INJECTION, SOLUTION INTRAVENOUS at 12:01

## 2022-02-15 ASSESSMENT — ACTIVITIES OF DAILY LIVING (ADL)
VISION_MANAGEMENT: NEAR SIGHTED
HEARING_DIFFICULTY_OR_DEAF: NO
TOILETING_ISSUES: NO
DRESSING/BATHING_DIFFICULTY: NO
FALL_HISTORY_WITHIN_LAST_SIX_MONTHS: NO
DOING_ERRANDS_INDEPENDENTLY_DIFFICULTY: NO
DIFFICULTY_COMMUNICATING: NO
CONCENTRATING,_REMEMBERING_OR_MAKING_DECISIONS_DIFFICULTY: NO
DIFFICULTY_EATING/SWALLOWING: NO
WALKING_OR_CLIMBING_STAIRS_DIFFICULTY: NO
WEAR_GLASSES_OR_BLIND: YES

## 2022-02-15 NOTE — ED PROVIDER NOTES
History   Chief Complaint:  Abdominal Pain and Hernia     The history is provided by the patient.      Isa Eller is a 61 year old female who presents with her sister for abdominal pain and a hernia. Patient notes having an incisional hernia. Since Saturday, the patient has not had a normal bowel movement. She instead has been outputting just water, alongside not being able to pass gas. Furthermore, she has not been eating due to being afraid there might be a bowel obstruction. The patient denies having any vomiting, chest pain, fevers, or shortness of breath. The patient notes when laying down, the pain is a 3/10. However, upon exertion, the pain is a 10/10. She occasionally drinks, but does not use drugs.    Review of Systems   Constitutional: Positive for appetite change. Negative for fever.   Respiratory: Negative for shortness of breath.    Cardiovascular: Negative for chest pain.   Gastrointestinal: Positive for abdominal pain and diarrhea (water). Negative for vomiting.   All other systems reviewed and are negative.    Allergies:  Sunflower Oil  Lisinopril    Medications:  ASPIRIN EC PO  clonazePAM   losartan-hydrochlorothiazide  Metoprolol Succinate  omeprazole  rosuvastatin     Past Medical History:     Anxiety   Clotting disorder   Hyperlipemia   Hypertension  PAD  Acalculous cholecystitis  Chest pain  Ileus   SBO     Past Surgical History:    Breast surgery  Bypass graft aortoiliac  Laparoscopic cholecystectomy with cholangiograms  Vascular surgery     Family History:    Hypertension - Mother  Hypertension - Father  Heart disease - Father    Social History:  The patient presents with her sister.  The patient is an occasional drinker.    Physical Exam     Patient Vitals for the past 24 hrs:   BP Temp Temp src Pulse Resp SpO2   02/14/22 2130 -- -- -- -- -- 98 %   02/14/22 2115 (!) 182/92 -- -- -- -- 98 %   02/14/22 2000 138/74 -- -- 84 -- 93 %   02/14/22 1945 (!) 150/73 -- -- 82 -- 94 %   02/14/22  1930 138/79 -- -- 87 -- 95 %   02/14/22 1915 128/69 -- -- 86 -- 94 %   02/14/22 1900 130/74 -- -- 86 -- 95 %   02/14/22 1805 (!) 168/95 97.3  F (36.3  C) Temporal 101 19 95 %     Physical Exam  Constitutional: Well developed, nontox appearance  Head: Atraumatic.   Mouth/Throat: Oropharynx is clear and moist.   Neck:  no stridor  Eyes: no scleral icterus  Cardiovascular: RRR, 2+ bilat radial pulses  Pulmonary/Chest: nml resp effort  Abdominal: ND, soft, midline inferior periumbilical tenderness, no obvious discrete hernia sac palpated, no rebound or guarding   Ext: Warm, well perfused, no edema  Neurological: A&O, symmetric facies, moves ext x4  Skin: Skin is warm and dry.   Psychiatric: Behavior is normal. Thought content normal.   Nursing note and vitals reviewed.    Emergency Department Course     Imaging:  CT Abdomen Pelvis w Contrast   Final Result   IMPRESSION:    1.  Small bowel obstruction secondary to a hernia slightly above and to the left of the umbilicus.   2.  No perforation.      XR Gastrografin Challenge    (Results Pending)     Report per radiology    Laboratory:  Labs Ordered and Resulted from Time of ED Arrival to Time of ED Departure   BASIC METABOLIC PANEL - Abnormal       Result Value    Sodium 136      Potassium 3.9      Chloride 107      Carbon Dioxide (CO2) 23      Anion Gap 6      Urea Nitrogen 7      Creatinine 1.00      Calcium 9.1      Glucose 117 (*)     GFR Estimate 63     INR - Normal    INR 0.94     COVID-19 VIRUS (CORONAVIRUS) BY PCR - Normal    SARS CoV2 PCR Negative     LACTIC ACID WHOLE BLOOD - Normal    Lactic Acid 1.1     CBC WITH PLATELETS AND DIFFERENTIAL    WBC Count 7.8      RBC Count 4.15      Hemoglobin 13.1      Hematocrit 38.7      MCV 93      MCH 31.6      MCHC 33.9      RDW 12.8      Platelet Count 226      % Neutrophils 66      % Lymphocytes 23      % Monocytes 8      % Eosinophils 1      % Basophils 1      % Immature Granulocytes 1      NRBCs per 100 WBC 0       Absolute Neutrophils 5.2      Absolute Lymphocytes 1.8      Absolute Monocytes 0.6      Absolute Eosinophils 0.1      Absolute Basophils 0.0      Absolute Immature Granulocytes 0.1      Absolute NRBCs 0.0     TYPE AND SCREEN, ADULT   ABO/RH TYPE AND SCREEN     Emergency Department Course:          Reviewed:  I reviewed nursing notes, vitals, past medical history, Care Everywhere and [facility]    Assessments:   I obtained history and examined the patient as noted above.    I rechecked the patient and explained findings.    Consults:   I consulted with Dr. Munroe from General Surgery.   I consulted with Dr. Ole MD from Hospitalist.     Interventions:   morphine (PF) injection 4 mg, IV   fentaNYL (PF) (SUBLIMAZE) injection 100 mcg, IV    Disposition:  The patient was admitted to the hospital under the care of Dr. Doe MD from Hospitalist.     Impression & Plan     CMS Diagnoses: None.    Medical Decision Makin year old female presenting w/ abdominal pain, vomiting, diarrhea     DDx includes SBO, incarcerated hernia, strangulated hernia, gastritis, gastroenteritis, ileus, electrolyte abnormality, mild dehydration.  Doubt vascular catastrophe given history and physical exam.  Labs significant for unremarkable abnormality.  Imaging sig for bowel obstruction secondary to hernia as described above.  I discussed the patient with on-call general surgery who evaluated the patient in the ED and reduce the hernia.  Plan admit the patient to the hospitalist service given the patient's SBO.  Patient counseled on all results, disposition and diagnosis.  They are understanding and agreeable to plan. Patient admitted in stable condition.       Diagnosis:    ICD-10-CM    1. SBO (small bowel obstruction) (H)  K56.609        Scribe Disclosure:  Stephan MALIK, am serving as a scribe at 7:34 PM on 2022 to document services personally performed by Shayne Lieberman MD based on my observations  and the provider's statements to me.     Shayne Lieberman MD  02/14/22 [id number]

## 2022-02-15 NOTE — CONSULTS
General Surgery Consultation    Isa Eller MRN# [id number]   Age: 61 year old YOB: 1960     Date of Admission:  2/14/2022    Reason for consult:            Small bowel obstruction       Requesting physician:            Shayne Lieberman MD                Assessment and Plan:   Assessment:   Isa Eller is a 61 year old female with a small bowel obstruction, possibly secondary to a ventral hernia, though this was ultimately reducible on exam. She has had a similar bowel obstruction in 10/2020 which resolved with hernia reduction and bowel rest. She would very much like her hernia surgery to be minimally invasive if possible, so she is not interested in an emergent open repair unless absolutely necessary.         Plan:   Continue conservative management with bowel rest and IVF. Her hernia is currently reducible, though she may have an ongoing small bowel obstruction due to adhesions elsewhere in her abdomen. Will hold off on NG tube since she is not vomiting, but will have her do a gastrografin challenge (PO administration) for diagnostic and therapeutic benefit.      Lacey Munroe MD          Chief Complaint:   Small bowel obstruction    History is obtained from the patient         History of Present Illness:   Isa Eller is a 61 year old female who presents with abdominal pain, nausea, and diarrhea for the past 2 days.  The pain is intermittent and cramping. Negative for associated symptoms of vomiting, fever and chills.  Last bowel movement was today and was watery.  Last flatus was 1-2 days ago (none today, but cannot remember last).  At baseline BMs are formed. Past surgical history includes an aortobifemoral bypass in 2018 at [facility] and a laparopscopic cholecystectomy .  She has at least 2 known hernias along her midline incision, the more superior contains incarcerated fat and the more inferior contains a loop of bowel on today's CT. This is very similar in appearance to a CT  performed in 10/2020 when she presented with similar symptoms and was diagnosed with a bowel obstruction at that time. That obstruction cleared in 2-3 days with bowel rest and no NG tube.         Anxiety, Clotting disorder (H), Hyperlipemia, Hypertension, and PAD (peripheral artery disease) (H).          Past Surgical History:     Past Surgical History:   Procedure Laterality Date     BREAST SURGERY       BYPASS GRAFT AORTOILIAC  2018     LAPAROSCOPIC CHOLECYSTECTOMY WITH CHOLANGIOGRAMS N/A 10/19/2014    Procedure: LAPAROSCOPIC CHOLECYSTECTOMY WITH CHOLANGIOGRAMS;  Surgeon: Seamus Holman MD;  Location: [facility] OR     VASCULAR SURGERY               Social History:     Social History     Tobacco Use     Smoking status: Former Smoker     Smokeless tobacco: Never Used     Tobacco comment: Vapes   Substance Use Topics     Alcohol use: Yes             Family History:     Family History   Problem Relation Age of Onset     Hypertension Mother      Heart Disease Father      Hypertension Father      No FH bleeding problems or reactions to anesthesia         Allergies:     Allergies   Allergen Reactions     Sunflower Oil Difficulty breathing     Lisinopril Cough             Medications:   No current facility-administered medications on file prior to encounter.  ASPIRIN EC PO, Take 81 mg by mouth daily   clonazePAM (KLONOPIN) 1 MG tablet, Take 1 mg by mouth 2 times daily as needed for anxiety   losartan-hydrochlorothiazide (HYZAAR) 50-12.5 MG tablet, Take 1 tablet by mouth daily  Metoprolol Succinate (TOPROL XL PO), Take 100 mg by mouth daily  omeprazole (PRILOSEC) 40 MG capsule, Take 1 capsule (40 mg) by mouth daily Take 30-60 minutes before a meal.  rosuvastatin (CRESTOR) 40 MG tablet, Take 40 mg by mouth every evening  VITAMIN D, CHOLECALCIFEROL, PO, Take 50,000 Units by mouth twice a week On Sunday and Wednesday.               Review of Systems:   The 10 point review of systems is negative other than noted in the HPI.           Physical Exam:   BP (!) 182/92   Pulse 84   Temp 97.3  F (36.3  C) (Temporal)   Resp 19   SpO2 98%   General - Well developed, well nourished female in no apparent distress  Eyes:  no scleral icterus or redness  Lymphatic: No cervical, or supraclavicular lymphadenopathy  Lungs: Clear to auscultation bilaterally  Heart: regular rate and rhythm, no murmurs  Abdomen:soft, rounded, distended with mild tenderness noted superior and to the left of her umbilicus. There is a soft loop of bowel in this location which can be massaged back through a small 1-1.5 cm hernia defect.  No rebound or guarding. Superior to this is a second hernia which contains ~2 cm mass of fat which is not reducible.  MSK: Extremities warm without edema  Neurologic: nonfocal  Psychiatric: Mood and affect appropriate  Skin: Without lesions, rashes, or juandice         Data:   Labs reviewed, no leukocytosis, lactic acid added on and pending.    Imaging:  All imaging studies reviewed by me and my interpretation of the abdominal CT is:  Small bowel obstruction with mildly dilated loops of small bowel. No gastric distension. Left paraumbilical hernia containing a loop of bowel, No bowel edema or other evidence of ischemia.     Lacey Munroe MD  2/14/2022 9:32 PM     Time spent with the patient, reviewing the EMR, reviewing laboratory and imaging studies, more than 50% of which was counseling and coordinating care:  45 minutes.

## 2022-02-15 NOTE — UTILIZATION REVIEW
Concurrent stay review; Secondary Review Determination     [facility]          Under the authority of the Utilization Management Committee, the utilization review process indicated a secondary review on the above patient.  The review outcome is based on review of the medical records, discussions with staff, and applying clinical experience noted on the date of the review.          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 Intractable abdominal pain due to small bowel obstruction possibly secondary to a ventral hernia that was reduced in the ER by general surgery   Gastrografin challenge 2/14 shows contrast in colon   Dispo: possibly home tomorrow depending on diet tolerance    Patient is clinically improving and there is no clear indication to change patient's status to inpatient. The severity of illness, intensity of service provided, expected LOS and risk for adverse outcome make the care appropriate for observation.      This document was produced using voice recognition software       The information on this document is developed by the utilization review team in order for the business office to ensure compliance.  This only denotes the appropriateness of proper admission status and does not reflect the quality of care rendered.         The definitions of Inpatient Status and Observation Status used in making the determination above are those provided in the CMS Coverage Manual, Chapter 1 and Chapter 6, section 70.4.      Sincerely,     KEILA BORJA MD    System Medical Director  Utilization Management  [facility].

## 2022-02-15 NOTE — PHARMACY-ADMISSION MEDICATION HISTORY
Admission medication history interview status for this patient is complete. See [facility] admission navigator for allergy information, prior to admission medications and immunization status.     Medication history interview done, indicate source(s): Patient  Medication history resources (including written lists, pill bottles, clinic record): [facility] dispense records; Care Everywhere [Critical access hospital]  Pharmacy: [id number] [address] [place] [address] [phone]      Changes made to PTA medication list:  Added: Losartan  Changed: None  Removed: Losartan-hctz    Actions taken by pharmacist (provider contacted, etc):None     Additional medication history information:None    Medication reconciliation/reorder completed by provider prior to medication history?  No     Prior to Admission medications    Medication Sig Last Dose Taking? Auth Provider   ASPIRIN EC PO Take 81 mg by mouth daily  2/13/2022 at Unknown time Yes Reported, Patient   clonazePAM (KLONOPIN) 1 MG tablet Take 1 mg by mouth 2 times daily as needed for anxiety  2/14/2022 at Unknown time Yes Unknown, Entered By History   losartan (COZAAR) 50 MG tablet Take 50 mg by mouth daily 2/13/2022 at Unknown time Yes Unknown, Entered By History   Metoprolol Succinate (TOPROL XL PO) Take 100 mg by mouth daily 2/13/2022 at Unknown time Yes Unknown, Entered By History   omeprazole (PRILOSEC) 40 MG capsule Take 1 capsule (40 mg) by mouth daily Take 30-60 minutes before a meal. 2/13/2022 at Unknown time Yes Penny Handy PA-C   rosuvastatin (CRESTOR) 40 MG tablet Take 40 mg by mouth every evening 2/13/2022 at Unknown time Yes Unknown, Entered By History   vitamin D2 (ERGOCALCIFEROL) 45697 units (1250 mcg) capsule Take 50,000 Units by mouth twice a week (Sunday and Wednesday) 2/13/2022 at Unknown time Yes Unknown, Entered By History

## 2022-02-15 NOTE — PROGRESS NOTES
[facility]    Hospitalist Progress Note  Name: Isa Eller    MRN: [id number]  Provider: Charline Warner MD  Date of Service: 02/15/2022    Assessment & Plan   Summary of Stay: Isa Eller is a 61 year old female who was admitted on 2/14/2022 acute small bowel obstruction.    Patient's past medical history significant for peripheral arterial disease, history of aorto iliac bypass graft, ventral and incisional hernia, history of lower extremity DVT anticoagulated on warfarin, history of smoking, hypertension hyperlipidemia presented with intractable abdominal pain.    #Intractable abdominal pain due to small bowel obstruction possibly secondary to a ventral hernia that was reduced in the ER by general surgery  -Patient has history of antral hernia along the incision from her aortoiliac bypass graft  -She has had bowel obstructions and ileus related to this  -She developed intractable pain on 2/12 that is only been worsening.  This was associated with nausea and diarrhea.  -In the ED she was afebrile and vitally stable  -Lab work including BMP and CBC was unremarkable  -CT scan showed small bowel obstruction secondary to hernia slightly above into the left of the umbilicus without perforation  -General surgery was called who evaluated patient in the ER and reduced her hernia with improvement of pain  -Overall seems to be improving started on clears by surgery  -General surgery following       #History of PAD  -History of bilateral iliac stenting in 2006 followed by aorto-iliac bypass in 2018  -No longer on anticoagulation ???  Unclear if she still needs to continue or not.  Patient left vascular surgery service at [facility].  Discussed with her that she needs to follow-up  -Hold all medicines until cleared by GS to take sips of fluid     #HTN  -Hold oral Toprol XL and replace with Metoprolol 5 mg IV every 6 hrs  -Hold losartan for tonight  -Labetolol PRN for hypertension     #HLD  -holding  statin     #Anxiety  -Continue clonazepam     #GERD  -Continue omeprazole          DVT Prophylaxis: Pneumatic Compression Devices  Code Status: Full Code    Disposition: Expected discharge in 1 to 2 days if able to tolerate p.o.      Interval History   Resumed care reviewed chart , abdominal pain is better is passing gas no BM yet.  No nausea no vomiting..  Diet advancement more than 10 point review systems was carried out was otherwise negative.  Detailed discussion about no anticoagulation after graft surgery for PAD.    -Data reviewed today: I reviewed all new labs and imaging reports over the last 24 hours. I personally reviewed no images or EKG's today.    Physical Exam   Temp: 97.5  F (36.4  C) Temp src: Oral BP: (!) 140/69 Pulse: 82   Resp: 20 SpO2: 96 % O2 Device: None (Room air)    Vitals:    02/15/22 0059   Weight: 83.5 kg (184 lb)     Vital Signs with Ranges  Temp:  [97.3  F (36.3  C)-97.5  F (36.4  C)] 97.5  F (36.4  C)  Pulse:  [] 82  Resp:  [19-20] 20  BP: (128-182)/(66-95) 140/69  SpO2:  [93 %-98 %] 96 %  I/O last 3 completed shifts:  In: 643 [P.O.:150; I.V.:493]  Out: -       GEN:  Alert, oriented x 3, appears comfortable, NAD.  PSYCH: pleasant, oriented, No acute distress.  HEENT:  PERRLA. Normal conjunctiva, normal hearing, nasal mucosa and Oropharynx are normal.  NECK:  Supple, no neck vein distention, adenopathy or bruits, normal thyroid.  HEART:  Normal S1, S2 with no murmur, no pericardial rub, gallops or S3 or S4.  LUNGS:  Clear to auscultation, normal Respiratory effort. No wheezing, rales or ronchi.  ABDOMEN: Abdomen appears mildly distended, bowel sounds are present, tenderness near her incision site on her abdomen.  EXTREMITIES:  No pedal edema, +2 pulses bilateral and equal.  SKIN:  Dry to touch, No rash, wound or ulcerations.  NEUROLOGIC:  CN 2-12 grossly intact, sensation is intact with no focal deficits.        Medications     lactated ringers 100 mL/hr at 02/15/22 0131        metoprolol  5 mg Intravenous Q6H     sodium chloride (PF)  3 mL Intracatheter Q8H     Data     Recent Labs   Lab 02/15/22  0701 02/14/22  1857   WBC 7.1 7.8   HGB 12.3 13.1   HCT 37.6 38.7   MCV 95 93    226     Recent Labs   Lab 02/15/22  0701 02/14/22  1857    136   POTASSIUM 4.1 3.9   CHLORIDE 108 107   CO2 25 23   ANIONGAP 6 6   GLC 99 117*   BUN 8 7   CR 1.06* 1.00   GFRESTIMATED 59* 63   JOSE 8.8 9.1     No results for input(s): CULT in the last 168 hours.  No results for input(s): SED, CRP in the last 168 hours.  Recent Labs   Lab 02/15/22  0701 02/14/22  1857   HGB 12.3 13.1     Recent Labs   Lab 02/15/22  0701   AST 56*   ALT 55*   ALKPHOS 91   BILITOTAL 0.7     Recent Labs   Lab 02/14/22  1857   INR 0.94     Recent Labs   Lab 02/14/22  2142   LACT 1.1     No results for input(s): LIPASE in the last 168 hours.  Recent Labs   Lab 02/15/22  0701 02/14/22  1857    226     Recent Labs   Lab 02/15/22  0701 02/14/22  1857   BUN 8 7   CR 1.06* 1.00     No results for input(s): TSH in the last 168 hours.  No results for input(s): TROPONIN, TROPI, TROPR, TROPONINIS in the last 168 hours.    Invalid input(s): TROP, TROPONINIES, TNIH  No results for input(s): COLOR, APPEARANCE, URINEGLC, URINEBILI, URINEKETONE, SG, UBLD, URINEPH, PROTEIN, UROBILINOGEN, NITRITE, LEUKEST, RBCU, WBCU in the last 168 hours.    Recent Results (from the past 24 hour(s))   CT Abdomen Pelvis w Contrast    Narrative    EXAM: CT ABDOMEN PELVIS W CONTRAST  LOCATION: [facility]  DATE/TIME: 2/14/2022 8:07 PM    INDICATION: Abdominal pain, hernia.  COMPARISON: None.  TECHNIQUE: CT scan of the abdomen and pelvis was performed following injection of IV contrast. Multiplanar reformats were obtained. Dose reduction techniques were used.  CONTRAST: 89mL Isovue 370    FINDINGS:   LOWER CHEST: No infiltrates or effusions.    HEPATOBILIARY: No significant mass or bile duct dilatation. No calcified gallstones.  Cholecystectomy.    PANCREAS: No significant mass, duct dilatation, or inflammatory change.    SPLEEN: Normal size.    ADRENAL GLANDS: No significant nodules.    KIDNEYS/BLADDER: No significant mass, stone, or hydronephrosis. A few areas of cortical scarring seen in the left kidney.    BOWEL: Dilated small bowel with transition at the hernia above and to the left of the umbilicus which contains bowel. No free air.    LYMPH NODES: No lymphadenopathy.    VASCULATURE: Aortic bypass changes. No aneurysm.    PELVIC ORGANS: Fibroid uterus.    MUSCULOSKELETAL: No frankly destructive bony lesions.      Impression    IMPRESSION:   1.  Small bowel obstruction secondary to a hernia slightly above and to the left of the umbilicus.  2.  No perforation.   XR Gastrografin Challenge    Narrative    GASTROGRAFIN CHALLENGE February 15, 2022 5:48 AM     HISTORY: Bowel obstruction.    COMPARISON: 2/14/2022.      Impression    IMPRESSION: Orally administered Gastrografin is present in nondilated  colon and extends to the rectum. Prominent small bowel loops noted in  the mid abdomen. No free air. Aortoiliac stenting noted.    HENNY QUINTERO MD         SYSTEM ID:  [id number]

## 2022-02-15 NOTE — PROGRESS NOTES
"PRIMARY DIAGNOSIS: \"GENERIC\" NURSING  OUTPATIENT/OBSERVATION GOALS TO BE MET BEFORE DISCHARGE:  1. ADLs back to baseline: Yes    2. Activity and level of assistance: Ambulating independently.    3. Pain status: Improved but still requiring IV narcotics.    4. Return to near baseline physical activity: Yes     Discharge Planner Nurse   Safe discharge environment identified: Yes  Barriers to discharge: Yes       Entered by: Reba Olsen 02/15/2022 7:27 AM    End of Shift Summary  For vital signs and complete assessments, please see documentation flowsheets.     Pertinent assessments: Tachycardic. Denies nausea and SOB. Dilaudid given for abdo pain. Up ind in room. BS+, not passing flatus and abd soft and tender.  Major Shift Events: AXR completed, pending result.  Treatment Plan: Bowel rest, IVF and symptom management.  Bedside Nurse: Reba Olsen, RN         Please review provider order for any additional goals.   Nurse to notify provider when observation goals have been met and patient is ready for discharge.  "

## 2022-02-15 NOTE — PROGRESS NOTES
[facility]    General Surgery Progress Note          Assessment and Plan:   Assessment:    Isa Eller is a 61 year old female admitted for bowel obstruction.  Past surgical history of open AAA repair in 2017.  Reducible ventral hernia  - Gastrografin challenge 2/14 shows contrast in colon        Plan:   Pain mgmt: IV dilaudid, Tylenol  Bowel: +gas, +loose BM  Diet: start clears, advance to full liquid tonight if tolerating  IVFs: LR @ 100 mL/hr  Activity: encourage ambulation 3-4 x daily   Dispo: possibly home tomorrow depending on diet tolerance     1400 addendum: Tolerating clears in small meals.  OK to increase to full liquids in small meals.  Yogurt encouraged.  Possible discharge 1-2 days depending on diet tolerance and ongoing bowel function.  Roberta Acosta PA-C        Interval History:   Pt resting in bed.  Reports some intermittent crampy pain.  Pain is much better than admission.  Passing gas, had several loose BM's overnight.  Denies nausea. Still feels a little bloated.  Ambulating in room.          Physical Exam:   Temp: 97.5  F (36.4  C) Temp src: Oral BP: (!) 140/69 Pulse: 82   Resp: 20   SpO2: 96 % O2 Device: None (Room air)        I/O last 3 completed shifts:  In: 643 [P.O.:150; I.V.:493]  Out: -     Constitutional: alert and no distress   Abdomen: Abdomen soft, non-tender, mild distention             Data:   Data   Recent Labs   Lab 02/15/22  0701 02/14/22  1857   WBC 7.1 7.8   HGB 12.3 13.1   MCV 95 93    226   INR  --  0.94    136   POTASSIUM 4.1 3.9   CHLORIDE 108 107   CO2 25 23   BUN 8 7   CR 1.06* 1.00   ANIONGAP 6 6   JOSE 8.8 9.1   GLC 99 117*   ALBUMIN 3.5  --    PROTTOTAL 6.8  --    BILITOTAL 0.7  --    ALKPHOS 91  --    ALT 55*  --    AST 56*  --         Mary Finley PA-C

## 2022-02-15 NOTE — ED TRIAGE NOTES
Presents from clinic with concern for strangulated hernia. Pt went in initially for eval of constipation, no flatus, and hernia which is at the location of a previously surgically repaired hernia. Hypertensive in triage otherwise VSS on RA.

## 2022-02-15 NOTE — H&P
History and Physical     Isa Eller MRN# [id number]   YOB: 1960 Age: 61 year old      Date of Admission:  2/14/2022    Primary care provider: Amena Stearns          Assessment and Plan:   Isa Eller is a 61 year old female with a PMH significant for PAD with history of aortoiliac bypass graft salting and incisional hernia, hx of lower extremity DVT with multiple years on warfarin, previous smoker, hypertension and hyperlipidemia who presents with intractable stomach pain.    Patient was discussed with Dr. Awad, who was provider in ED. Chart review of ED work up was reviewed as well as chart review of Care Everywhere, previous visits and admissions.     #Intractable abdominal pain due to small bowel obstruction possibly secondary to a ventral hernia that was reduced in the ER by general surgery  -Patient has history of antral hernia along the incision from her aortoiliac bypass graft  -She has had bowel obstructions and ileus related to this  -She developed intractable pain on 2/12 that is only been worsening.  This was associated with nausea and diarrhea.  -In the ED she was afebrile and vitally stable  -Lab work including BMP and CBC was unremarkable  -CT scan showed small bowel obstruction secondary to hernia slightly above into the left of the umbilicus without perforation  -General surgery was called who evaluated patient in the ER and reduced her hernia with improvement of pain  -Plan for Gastrografin challenge this evening, bowel rest (she may have a small amount of ice chips) and fluid support  -General surgery following  -We will start as observation in case she discharges to home    #History of PAD  -History of bilateral iliac stenting in 2006 followed by aorto-iliac bypass in 2018  -No longer on anticoagulation  -Hold all medicines until cleared by GS to take sips of fluid    #HTN  -Hold oral Toprol XL and replace with Metoprolol 5 mg IV every 6 hrs  -Hold losartan for  tonight  -Labetolol PRN for hypertension    #HLD  -holding statin    #Anxiety  -Continue clonazepam    #GERD  -Continue omeprazole      A family member was updated in the emergency room      Social: No concerns  Code: Discussed with patient and they have chosen full code  VTE prophylaxis: PCD's  Disposition: Observation                    Chief Complaint:   Abdominal pain         History of Present Illness:   Isa Eller is a 61 year old female who presents with intractable abdominal pain.  The pain is along her arterial femoral bypass incision.  She states that it started on 2/12 morning along with nausea and diarrhea.  It has not been associated with much vomiting or fever.  She last passed gas 1 or 2 days ago.  She is familiar with pain from her known incisional hernia but this time it became more persistent and intractable.  She presented similarly in 10/2020 and had a bowel obstruction that cleared in 2 to 3 days with bowel rest and no NG tube.  She otherwise feels well and has been able to take her medicines and drink some fluids.  She denies smoking and regular alcohol use.             Past Medical History:     Past Medical History:   Diagnosis Date     Anxiety      Clotting disorder (H)      Hyperlipemia      Hypertension      PAD (peripheral artery disease) (H)                Past Surgical History:     Past Surgical History:   Procedure Laterality Date     BREAST SURGERY       BYPASS GRAFT AORTOILIAC  2018     LAPAROSCOPIC CHOLECYSTECTOMY WITH CHOLANGIOGRAMS N/A 10/19/2014    Procedure: LAPAROSCOPIC CHOLECYSTECTOMY WITH CHOLANGIOGRAMS;  Surgeon: Seamus Holman MD;  Location: [facility] OR     VASCULAR SURGERY                 Social History:     Social History     Socioeconomic History     Marital status: Single     Spouse name: Not on file     Number of children: Not on file     Years of education: Not on file     Highest education level: Not on file   Occupational History     Not on file   Tobacco Use      Smoking status: Former Smoker     Smokeless tobacco: Never Used     Tobacco comment: Vapes   Substance and Sexual Activity     Alcohol use: Yes     Drug use: Yes     Types: Marijuana     Sexual activity: Not on file   Other Topics Concern     Not on file   Social History Narrative     Not on file     Social Determinants of Health     Financial Resource Strain: Not on file   Food Insecurity: Not on file   Transportation Needs: Not on file   Physical Activity: Not on file   Stress: Not on file   Social Connections: Not on file   Intimate Partner Violence: Not on file   Housing Stability: Not on file               Family History:     Family History   Problem Relation Age of Onset     Hypertension Mother      Heart Disease Father      Hypertension Father               Allergies:      Allergies   Allergen Reactions     Sunflower Oil Difficulty breathing     Lisinopril Cough               Medications:     Prior to Admission medications    Medication Sig Last Dose Taking? Auth Provider   clonazePAM (KLONOPIN) 1 MG tablet Take 1 mg by mouth 2 times daily as needed for anxiety   Yes Unknown, Entered By History   losartan (COZAAR) 50 MG tablet Take 50 mg by mouth daily  Yes Unknown, Entered By History   Metoprolol Succinate (TOPROL XL PO) Take 100 mg by mouth daily  Yes Unknown, Entered By History   omeprazole (PRILOSEC) 40 MG capsule Take 1 capsule (40 mg) by mouth daily Take 30-60 minutes before a meal.  Yes Penny Handy PA-C   rosuvastatin (CRESTOR) 40 MG tablet Take 40 mg by mouth every evening  Yes Unknown, Entered By History   vitamin D2 (ERGOCALCIFEROL) 43939 units (1250 mcg) capsule Take 50,000 Units by mouth twice a week (Sunday and Wednesday)  Yes Unknown, Entered By History   ASPIRIN EC PO Take 81 mg by mouth daily    Reported, Patient              Review of Systems:   A Comprehensive greater than 10 system review of systems was carried out.  Pertinent positives and negatives are noted above.  Otherwise  negative for contributory information.            Physical Exam:   Blood pressure (!) 182/92, pulse 84, temperature 97.3  F (36.3  C), temperature source Temporal, resp. rate 19, SpO2 98 %, not currently breastfeeding.  Exam:  GENERAL:  Comfortable.  PSYCH: pleasant, oriented, No acute distress.  HEENT:  PERRLA. Normal conjunctiva, normal hearing, nasal mucosa and Oropharynx are normal.  NECK:  Supple, no neck vein distention, adenopathy or bruits, normal thyroid.  HEART:  Normal S1, S2 with no murmur, no pericardial rub, gallops or S3 or S4.  LUNGS:  Clear to auscultation, normal Respiratory effort. No wheezing, rales or ronchi.  ABDOMEN: Abdomen appears mildly distended, bowel sounds are present, tenderness near her incision site on her abdomen.  EXTREMITIES:  No pedal edema, +2 pulses bilateral and equal.  SKIN:  Dry to touch, No rash, wound or ulcerations.  NEUROLOGIC:  CN 2-12 grossly intact, sensation is intact with no focal deficits.               Data:     Recent Labs   Lab 02/14/22  1857   WBC 7.8   HGB 13.1   HCT 38.7   MCV 93        Recent Labs   Lab 02/14/22  1857      POTASSIUM 3.9   CHLORIDE 107   CO2 23   ANIONGAP 6   *   BUN 7   CR 1.00   GFRESTIMATED 63   JOSE 9.1     Recent Labs   Lab 02/14/22  2142   LACT 1.1         Recent Results (from the past 24 hour(s))   CT Abdomen Pelvis w Contrast    Narrative    EXAM: CT ABDOMEN PELVIS W CONTRAST  LOCATION: [facility]  DATE/TIME: 2/14/2022 8:07 PM    INDICATION: Abdominal pain, hernia.  COMPARISON: None.  TECHNIQUE: CT scan of the abdomen and pelvis was performed following injection of IV contrast. Multiplanar reformats were obtained. Dose reduction techniques were used.  CONTRAST: 89mL Isovue 370    FINDINGS:   LOWER CHEST: No infiltrates or effusions.    HEPATOBILIARY: No significant mass or bile duct dilatation. No calcified gallstones. Cholecystectomy.    PANCREAS: No significant mass, duct dilatation, or  inflammatory change.    SPLEEN: Normal size.    ADRENAL GLANDS: No significant nodules.    KIDNEYS/BLADDER: No significant mass, stone, or hydronephrosis. A few areas of cortical scarring seen in the left kidney.    BOWEL: Dilated small bowel with transition at the hernia above and to the left of the umbilicus which contains bowel. No free air.    LYMPH NODES: No lymphadenopathy.    VASCULATURE: Aortic bypass changes. No aneurysm.    PELVIC ORGANS: Fibroid uterus.    MUSCULOSKELETAL: No frankly destructive bony lesions.      Impression    IMPRESSION:   1.  Small bowel obstruction secondary to a hernia slightly above and to the left of the umbilicus.  2.  No perforation.         Penny Handy PA-C

## 2022-02-15 NOTE — ED NOTES
[facility]  ED Nurse Handoff Report    Isa Eller is a 61 year old female   ED Chief complaint: Abdominal Pain and Hernia  . ED Diagnosis:   Final diagnoses:   SBO (small bowel obstruction) (H)     Allergies:   Allergies   Allergen Reactions     Sunflower Oil Difficulty breathing     Lisinopril Cough       Code Status: Full Code  Activity level - Baseline/Home:  Independent. Activity Level - Current:   Independent. Lift room needed: No. Bariatric: No   Needed: No   Isolation: No. Infection: Not Applicable.     Vital Signs:   Vitals:    02/14/22 1945 02/14/22 2000 02/14/22 2115 02/14/22 2130   BP: (!) 150/73 138/74 (!) 182/92    Pulse: 82 84     Resp:       Temp:       TempSrc:       SpO2: 94% 93% 98% 98%       Cardiac Rhythm:  ,      Pain level:    Patient confused: noPatient Falls Risk: No.   Elimination Status: Has voided   Patient Report - Initial Complaint: Presents from clinic with concern for strangulated hernia. Pt went in initially for eval of constipation, no flatus, and hernia which is at the location of a previously surgically repaired hernia. Hypertensive in triage otherwise VSS on RA.   . Focused Assessment: A&OX4. Pleasant and cooperative.   Tests Performed:   Labs Ordered and Resulted from Time of ED Arrival to Time of ED Departure   BASIC METABOLIC PANEL - Abnormal       Result Value    Sodium 136      Potassium 3.9      Chloride 107      Carbon Dioxide (CO2) 23      Anion Gap 6      Urea Nitrogen 7      Creatinine 1.00      Calcium 9.1      Glucose 117 (*)     GFR Estimate 63     INR - Normal    INR 0.94     CBC WITH PLATELETS AND DIFFERENTIAL    WBC Count 7.8      RBC Count 4.15      Hemoglobin 13.1      Hematocrit 38.7      MCV 93      MCH 31.6      MCHC 33.9      RDW 12.8      Platelet Count 226      % Neutrophils 66      % Lymphocytes 23      % Monocytes 8      % Eosinophils 1      % Basophils 1      % Immature Granulocytes 1      NRBCs per 100 WBC 0      Absolute  Neutrophils 5.2      Absolute Lymphocytes 1.8      Absolute Monocytes 0.6      Absolute Eosinophils 0.1      Absolute Basophils 0.0      Absolute Immature Granulocytes 0.1      Absolute NRBCs 0.0     COVID-19 VIRUS (CORONAVIRUS) BY PCR   ABO/[facility] TYPE AND SCREEN     CT Abdomen Pelvis w Contrast   Final Result   IMPRESSION:    1.  Small bowel obstruction secondary to a hernia slightly above and to the left of the umbilicus.   2.  No perforation.         Abnormal Results: See results.   Treatments provided: See MAR.  Family Comments: Sister bedside.  OBS brochure/video discussed/provided to patient:  No  ED Medications:   Medications   morphine (PF) injection 4 mg (4 mg Intravenous Given 2/14/22 1935)   iopamidol (ISOVUE-370) solution 500 mL (89 mLs Intravenous Given 2/14/22 2008)   CT Scan Flush (62 mLs Intravenous Given 2/14/22 2008)   fentaNYL (PF) (SUBLIMAZE) injection 100 mcg (100 mcg Intravenous Given 2/14/22 2108)     Drips infusing:  No  For the majority of the shift, the patient's behavior Green. Interventions performed were NA.    Sepsis treatment initiated: No     Patient tested for COVID 19 prior to admission: YES    ED Nurse Name/Phone Number: Rodney Laws RN,   9:32 PM  RECEIVING UNIT ED HANDOFF REVIEW    Above ED Nurse Handoff Report was reviewed: Yes  Reviewed by: Reba Olsen RN on February 15, 2022 at 12:47 AM

## 2022-02-16 ENCOUNTER — PREP FOR PROCEDURE (OUTPATIENT)
Dept: SURGERY | Facility: CLINIC | Age: 62
End: 2022-02-16
Payer: COMMERCIAL

## 2022-02-16 VITALS
HEART RATE: 91 BPM | TEMPERATURE: 97.8 F | DIASTOLIC BLOOD PRESSURE: 78 MMHG | OXYGEN SATURATION: 94 % | BODY MASS INDEX: 30.84 KG/M2 | WEIGHT: 184 LBS | SYSTOLIC BLOOD PRESSURE: 171 MMHG | RESPIRATION RATE: 18 BRPM

## 2022-02-16 DIAGNOSIS — K43.9 VENTRAL HERNIA WITHOUT OBSTRUCTION OR GANGRENE: Primary | ICD-10-CM

## 2022-02-16 LAB
ANION GAP SERPL CALCULATED.3IONS-SCNC: 7 MMOL/L (ref 3–14)
BUN SERPL-MCNC: 5 MG/DL (ref 7–30)
CALCIUM SERPL-MCNC: 8.6 MG/DL (ref 8.5–10.1)
CHLORIDE BLD-SCNC: 106 MMOL/L (ref 94–109)
CO2 SERPL-SCNC: 25 MMOL/L (ref 20–32)
CREAT SERPL-MCNC: 0.86 MG/DL (ref 0.52–1.04)
ERYTHROCYTE [DISTWIDTH] IN BLOOD BY AUTOMATED COUNT: 12.7 % (ref 10–15)
GFR SERPL CREATININE-BSD FRML MDRD: 75 ML/MIN/1.73M2
GLUCOSE BLD-MCNC: 110 MG/DL (ref 70–99)
HCT VFR BLD AUTO: 35.6 % (ref 35–47)
HGB BLD-MCNC: 11.7 G/DL (ref 11.7–15.7)
HGB BLD-MCNC: 12 G/DL (ref 11.7–15.7)
MCH RBC QN AUTO: 31.5 PG (ref 26.5–33)
MCHC RBC AUTO-ENTMCNC: 32.9 G/DL (ref 31.5–36.5)
MCV RBC AUTO: 96 FL (ref 78–100)
PLATELET # BLD AUTO: 189 10E3/UL (ref 150–450)
POTASSIUM BLD-SCNC: 3.8 MMOL/L (ref 3.4–5.3)
RBC # BLD AUTO: 3.71 10E6/UL (ref 3.8–5.2)
SODIUM SERPL-SCNC: 138 MMOL/L (ref 133–144)
WBC # BLD AUTO: 4.5 10E3/UL (ref 4–11)

## 2022-02-16 PROCEDURE — 85014 HEMATOCRIT: CPT | Performed by: INTERNAL MEDICINE

## 2022-02-16 PROCEDURE — 96361 HYDRATE IV INFUSION ADD-ON: CPT

## 2022-02-16 PROCEDURE — 99217 PR OBSERVATION CARE DISCHARGE: CPT | Performed by: INTERNAL MEDICINE

## 2022-02-16 PROCEDURE — G0378 HOSPITAL OBSERVATION PER HR: HCPCS

## 2022-02-16 PROCEDURE — 85018 HEMOGLOBIN: CPT | Performed by: INTERNAL MEDICINE

## 2022-02-16 PROCEDURE — 250N000013 HC RX MED GY IP 250 OP 250 PS 637: Performed by: INTERNAL MEDICINE

## 2022-02-16 PROCEDURE — 36415 COLL VENOUS BLD VENIPUNCTURE: CPT | Performed by: INTERNAL MEDICINE

## 2022-02-16 PROCEDURE — 82310 ASSAY OF CALCIUM: CPT | Performed by: INTERNAL MEDICINE

## 2022-02-16 RX ORDER — NALOXONE HYDROCHLORIDE 0.4 MG/ML
0.2 INJECTION, SOLUTION INTRAMUSCULAR; INTRAVENOUS; SUBCUTANEOUS
Status: DISCONTINUED | OUTPATIENT
Start: 2022-02-16 | End: 2022-02-16 | Stop reason: HOSPADM

## 2022-02-16 RX ORDER — NALOXONE HYDROCHLORIDE 0.4 MG/ML
0.4 INJECTION, SOLUTION INTRAMUSCULAR; INTRAVENOUS; SUBCUTANEOUS
Status: DISCONTINUED | OUTPATIENT
Start: 2022-02-16 | End: 2022-02-16 | Stop reason: HOSPADM

## 2022-02-16 RX ORDER — LOSARTAN POTASSIUM 50 MG/1
50 TABLET ORAL DAILY
Status: DISCONTINUED | OUTPATIENT
Start: 2022-02-16 | End: 2022-02-16 | Stop reason: HOSPADM

## 2022-02-16 RX ORDER — HYDRALAZINE HYDROCHLORIDE 20 MG/ML
10 INJECTION INTRAMUSCULAR; INTRAVENOUS EVERY 4 HOURS PRN
Status: DISCONTINUED | OUTPATIENT
Start: 2022-02-16 | End: 2022-02-16 | Stop reason: HOSPADM

## 2022-02-16 RX ORDER — METOPROLOL SUCCINATE 100 MG/1
100 TABLET, EXTENDED RELEASE ORAL DAILY
Status: DISCONTINUED | OUTPATIENT
Start: 2022-02-16 | End: 2022-02-16 | Stop reason: HOSPADM

## 2022-02-16 RX ADMIN — LOSARTAN POTASSIUM 50 MG: 50 TABLET, FILM COATED ORAL at 09:28

## 2022-02-16 RX ADMIN — METOPROLOL SUCCINATE 100 MG: 100 TABLET, EXTENDED RELEASE ORAL at 07:54

## 2022-02-16 RX ADMIN — OMEPRAZOLE 20 MG: 20 CAPSULE, DELAYED RELEASE ORAL at 09:28

## 2022-02-16 NOTE — PROGRESS NOTES
[facility]    General Surgery Progress Note          Assessment and Plan:   Assessment:    Isa Eller is a 61 year old female admitted for bowel obstruction.  Past surgical history of open AAA repair in 2017.  Reducible ventral hernia  - Gastrografin challenge 2/14 shows contrast in colon        Plan:   Advance to low fiber diet  Pain mgmt: IV dilaudid, Tylenol  IVFs: LR @ 100 mL/hr  Activity: encourage ambulation 3-4 x daily   Dispo: discharge home today vs tomorrow when tolerating low fiber diet  F/u with Dr. Munroe as an outpatient to discuss hernia repair           Interval History:   Patient feeling improved overall. Only c/o tenderness at hernia sites.  Denies bloating or nausea.  Tolerated full liquid diet, hungry for solids.  + passing flatus and last BM was yesterday evening (small, watery).           Physical Exam:   Temp: 97.8  F (36.6  C) Temp src: Oral BP: (!) 188/80 Pulse: 79   Resp: 18   SpO2: 96 % O2 Device: None (Room air)        No intake/output data recorded.    Constitutional: alert and no distress   Abdomen: Abdomen soft, obese, mildly tender at central abdomen            Data:   Data   Recent Labs   Lab 02/16/22  0711 02/15/22  0701 02/14/22  1857   WBC 4.5 7.1 7.8   HGB 11.7 12.3 13.1   MCV 96 95 93    194 226   INR  --   --  0.94    139 136   POTASSIUM 3.8 4.1 3.9   CHLORIDE 106 108 107   CO2 25 25 23   BUN 5* 8 7   CR 0.86 1.06* 1.00   ANIONGAP 7 6 6   JOSE 8.6 8.8 9.1   * 99 117*   ALBUMIN  --  3.5  --    PROTTOTAL  --  6.8  --    BILITOTAL  --  0.7  --    ALKPHOS  --  91  --    ALT  --  55*  --    AST  --  56*  --         Pat King PA-C     Agree with above. Tolerating low fiber diet with no nausea. Liquid stools continue. Hernia remains reducible. discharge later this afternoon. Will schedule robotic repair of ventral hernias as an outpatient.     Lacey Munroe MD

## 2022-02-16 NOTE — PLAN OF CARE
End of Shift Summary  For vital signs and complete assessments, please see documentation flowsheets.     Pertinent assessments: Tachycardic. Denies nausea and SOB. Dilaudid given for abdo pain. Up ind in room. BS+, passing flatus, abd soft and tender.    Major Shift Events:Advanced to clear liquids then full liquids, continues to have frequent loose stools    Treatment Plan: Slow advancement of diet    Bedside Nurse: Lary Ellis RN

## 2022-02-16 NOTE — PLAN OF CARE
Goal Outcome Evaluation:             Pt to discharge to home with no outside support services. Pain has resolved. General surgery will call to schedule hernia repair. Has planned colonoscopy on Monday.     Instruction provided for low fiber diet, instructed to continue on this diet until follow up.   Sister to transport.   Instructed to track the BP readings and discuss with PCP next week.

## 2022-02-16 NOTE — DISCHARGE SUMMARY
[facility]  Discharge Summary  Hospitalist      Date of Admission:  2/14/2022  Date of Discharge:  2/16/2022  Provider:  Chraline Warner MD  Date of Service (when I last saw the patient): 02/16/22      Primary Provider: Amena Stearns          Discharge Diagnosis:   Discharge Diagnoses     #Intractable abdominal pain due to small bowel obstruction possibly secondary to a ventral hernia that was reduced in the ER by general surgery    History of PAD: Needs follow-up with vascular surgery patient is not an anticoagulation which needs to be reassessed by a vascular surgeon    Hypertension        Other medical issues:  Past Medical History:   Diagnosis Date     Anxiety      Clotting disorder (H)      Hyperlipemia      Hypertension      PAD (peripheral artery disease) (H)           History of Present Illness   Isa Eller is an 61 year old female who presented with abdominal pain.  Please see the admission history and physical for full details.    Hospital Course     Isa Eller was admitted on 2/14/2022.  She is a 61 year old female who was admitted with acute small bowel obstruction.     Patient's past medical history significant for peripheral arterial disease, history of aorto iliac bypass graft, ventral and incisional hernia, history of lower extremity DVT anticoagulated on warfarin, history of smoking, hypertension hyperlipidemia presented with intractable abdominal pain.     #Intractable abdominal pain due to small bowel obstruction possibly secondary to a ventral hernia that was reduced in the ER by general surgery  -Patient has history of antral hernia along the incision from her aortoiliac bypass graft  -She has had bowel obstructions and ileus related to this  -She developed intractable pain on 2/12 that is only been worsening.  This was associated with nausea and diarrhea.  -In the ED she was afebrile and vitally stable  -Lab work including BMP and CBC was unremarkable  -CT  scan showed small bowel obstruction secondary to hernia slightly above into the left of the umbilicus without perforation  -General surgery was called who evaluated patient in the ER and reduced her hernia with improvement of pain  -Overall seems to be improving started on clears by surgery  -General surgery following  -Bowel obstruction resolved  -Patient was advanced to low fiber diet prior to discharge        #History of PAD  -History of bilateral iliac stenting in 2006 followed by aorto-iliac bypass in 2018  -No longer on anticoagulation ???  Unclear if she still needs to continue or not.  Patient left vascular surgery service at [facility].  Discussed with her that she needs to follow-up with vascular surgery       #HTN  -Resume prior to admission blood pressure medications  -Hospitalization blood pressure was elevated however patient was n.p.o. and home regiment was not followed initially.  Patient is being discharged on home regimen   -Patient is advised to keep record of her blood pressure and follow-up with primary care provider to optimize blood pressure management may need additional medication.  -She is asymptomatic with elevated blood pressure.  -Patient is advised to call or come if there are any new or worsening symptoms to watch for chest pain shortness of breath lightheadedness dizziness numbness weakness or any new symptoms     #HLD  -Resume statin symptoms     #Anxiety  -Continue clonazepam     #GERD  -Continue omeprazole    #Drop in hgb  -Secondary to fluid shifts  -No evidence of blood  -Repeat hemoglobin ordered if stable patient can be discharged       Significant Results and Procedures   As noted above    Pending Results   Unresulted Labs Ordered in the Past 30 Days of this Admission     No orders found from 1/15/2022 to 2/15/2022.          Code Status   Full Code       Primary Care Physician   Amena Stearns    Physical Exam   Temp: 97.8  F (36.6  C) Temp src: Oral BP: (!) 188/80 Pulse:  79   Resp: 18 SpO2: 96 % O2 Device: None (Room air)  Repeat blood pressure after blood pressure medications was lower  Vitals:    02/15/22 0059   Weight: 83.5 kg (184 lb)     Vital Signs with Ranges  Temp:  [97.5  F (36.4  C)-98.2  F (36.8  C)] 97.8  F (36.6  C)  Pulse:  [75-93] 79  Resp:  [12-18] 18  BP: (153-188)/(60-88) 188/80  SpO2:  [92 %-98 %] 96 %  No intake/output data recorded.    Constitutional: Awake, alert, cooperative, no apparent distress, and appears stated age.  Respiratory: No increased work of breathing, good air exchange, clear to auscultation bilaterally, no crackles or wheezing.  Cardiovascular: Normal apical impulse,normal S1 and S2,   GI:  normal bowel sounds, soft, non-tender,       Discharge Disposition   Discharged to home    Consultations This Hospital Stay   SURGERY GENERAL IP CONSULT    Time Spent on this Encounter   I, Charline Warner MD, personally saw the patient today and spent greater than 30 minutes discharging this patient.    Discharge Orders      Reason for your hospital stay    Please refer to discharge summary.     Follow-up and recommended labs and tests     Follow up with primary care provider, Amena Stearns, within 7 days for hospital follow- up.  The following labs/tests are recommended: Hemoglobin.     Activity    Your activity upon discharge: activity as tolerated     Diet    Follow this diet upon discharge: Orders Placed This Encounter      Low Fiber Diet     Discharge Medications   Current Discharge Medication List      CONTINUE these medications which have NOT CHANGED    Details   ASPIRIN EC PO Take 81 mg by mouth daily       clonazePAM (KLONOPIN) 1 MG tablet Take 1 mg by mouth 2 times daily as needed for anxiety       losartan (COZAAR) 50 MG tablet Take 50 mg by mouth daily      Metoprolol Succinate (TOPROL XL PO) Take 100 mg by mouth daily      omeprazole (PRILOSEC) 40 MG capsule Take 1 capsule (40 mg) by mouth daily Take 30-60 minutes before a meal.  Qty: 90  capsule, Refills: 1    Associated Diagnoses: Gastroesophageal reflux disease, esophagitis presence not specified      rosuvastatin (CRESTOR) 40 MG tablet Take 40 mg by mouth every evening      vitamin D2 (ERGOCALCIFEROL) 74980 units (1250 mcg) capsule Take 50,000 Units by mouth twice a week (Sunday and Wednesday)           Allergies   Allergies   Allergen Reactions     Sunflower Oil Difficulty breathing     Lisinopril Cough     Data   Most Recent 3 CBC's:Recent Labs   Lab Test 02/16/22  0711 02/15/22  0701 02/14/22  1857   WBC 4.5 7.1 7.8   HGB 11.7 12.3 13.1   MCV 96 95 93    194 226      Most Recent 3 BMP's:  Recent Labs   Lab Test 02/16/22  0711 02/15/22  0701 02/14/22  1857    139 136   POTASSIUM 3.8 4.1 3.9   CHLORIDE 106 108 107   CO2 25 25 23   BUN 5* 8 7   CR 0.86 1.06* 1.00   ANIONGAP 7 6 6   JOSE 8.6 8.8 9.1   * 99 117*     Most Recent 2 LFT's:  Recent Labs   Lab Test 02/15/22  0701 10/07/20  1719   AST 56* 42   ALT 55* 54*   ALKPHOS 91 101   BILITOTAL 0.7 1.0     Most Recent INR's and Anticoagulation Dosing History:  Anticoagulation Dose History     Recent Dosing and Labs Latest Ref Rng & Units 10/18/2014 10/19/2014 10/20/2014 7/20/2016 7/21/2016 2/14/2022    Warfarin 5 mg - - 5 mg - - - -    Warfarin 7.5 mg - - - - 7.5 mg - -    INR 0.85 - 1.15 2.78(H) 1.16(H) 1.17(H) 2.20(H) 2.42(H) 0.94        Most Recent 3 Troponin's:  Recent Labs   Lab Test 04/09/18  1135 07/20/16  1832 07/20/16  0927 07/20/16  0925   TROPI <0.015 <0.015  The 99th percentile for upper reference range is 0.045 ug/L.  Troponin values in   the range of 0.045 - 0.120 ug/L may be associated with risks of adverse   clinical events.    --  <0.015  The 99th percentile for upper reference range is 0.045 ug/L.  Troponin values in   the range of 0.045 - 0.120 ug/L may be associated with risks of adverse   clinical events.     TROPONIN  --   --  0.08  --      Most Recent Cholesterol Panel:No lab results found.  Most Recent 6  Bacteria Isolates From Any Culture (See EPIC Reports for Culture Details):No lab results found.  Most Recent TSH, T4 and A1c Labs:  Recent Labs   Lab Test 10/20/14  0608   A1C 6.0     Results for orders placed or performed during the hospital encounter of 02/14/22   CT Abdomen Pelvis w Contrast    Narrative    EXAM: CT ABDOMEN PELVIS W CONTRAST  LOCATION: [facility]  DATE/TIME: 2/14/2022 8:07 PM    INDICATION: Abdominal pain, hernia.  COMPARISON: None.  TECHNIQUE: CT scan of the abdomen and pelvis was performed following injection of IV contrast. Multiplanar reformats were obtained. Dose reduction techniques were used.  CONTRAST: 89mL Isovue 370    FINDINGS:   LOWER CHEST: No infiltrates or effusions.    HEPATOBILIARY: No significant mass or bile duct dilatation. No calcified gallstones. Cholecystectomy.    PANCREAS: No significant mass, duct dilatation, or inflammatory change.    SPLEEN: Normal size.    ADRENAL GLANDS: No significant nodules.    KIDNEYS/BLADDER: No significant mass, stone, or hydronephrosis. A few areas of cortical scarring seen in the left kidney.    BOWEL: Dilated small bowel with transition at the hernia above and to the left of the umbilicus which contains bowel. No free air.    LYMPH NODES: No lymphadenopathy.    VASCULATURE: Aortic bypass changes. No aneurysm.    PELVIC ORGANS: Fibroid uterus.    MUSCULOSKELETAL: No frankly destructive bony lesions.      Impression    IMPRESSION:   1.  Small bowel obstruction secondary to a hernia slightly above and to the left of the umbilicus.  2.  No perforation.   XR Gastrografin Challenge    Narrative    GASTROGRAFIN CHALLENGE February 15, 2022 5:48 AM     HISTORY: Bowel obstruction.    COMPARISON: 2/14/2022.      Impression    IMPRESSION: Orally administered Gastrografin is present in nondilated  colon and extends to the rectum. Prominent small bowel loops noted in  the mid abdomen. No free air. Aortoiliac stenting  noted.    HENNY QUINTERO MD         SYSTEM ID:  [id number]           Disclaimer: This note consists of symbols derived from keyboarding, dictation and/or voice recognition software. As a result, there may be errors in the script that have gone undetected. Please consider this when interpreting information found in this chart.

## 2022-02-16 NOTE — PLAN OF CARE
Pertinent assessments: A&Ox4. Up ind. BP elevated. RA. Dilaudid given for abdo pain.  BS active, passing gas, having loose stools. Denies nausea. Tolerating full liquid diet. Abd soft but tender.    Major Shift Events:Uneventful    Treatment Plan: Slow advancement of diet    Bedside Nurse: Babs Gonzalez RN

## 2022-02-18 ENCOUNTER — TELEPHONE (OUTPATIENT)
Dept: SURGERY | Facility: CLINIC | Age: 62
End: 2022-02-18
Payer: COMMERCIAL

## 2022-02-18 DIAGNOSIS — Z11.59 ENCOUNTER FOR SCREENING FOR OTHER VIRAL DISEASES: Primary | ICD-10-CM

## 2022-02-18 NOTE — LETTER
[facility]    [address], [address]  [place], [place] [address]  Phone [phone]  Fax [phone] [address], [address]  [place] [address]  Phone [phone]  Fax [phone]    [email or web address]     Isa Eller     You have been scheduled for a COVID-19 testing appointment at [facility].    3/5/2022 at 3:00 PM       The clinic is located at:  [address]  [address]    Once you arrive, follow the gold/yellow curbside testing signs.  [place] in the North lot (between the clinic and shopping center).      Stay in your vehicle until your appointment time then enter the clinic via the EAST door (not the main entrance).  There will be four steps to walk up.      Please note this appointment time is approximate, thank you in advance for your patience while you wait your turn to be tested!  Please wear a mask or face covering to the testing site.  Have your ID out and ready to show staff when you arrive.    Following your testing, you will be required to self-isolate until your surgery.  If you need a note for your employer due to this, please let us know.

## 2022-02-18 NOTE — TELEPHONE ENCOUNTER
Type of surgery: ROBOT-ASSISTED REPAIR OF MULTIPLE MIDLINE VENTRAL HERNIAS  Location of surgery: [facility]  Date and time of surgery: 3/9/2022 @ 11:20 AM   Surgeon: Lacey Munroe MD   Pre-Op Appt Date: H&P IN HOUSE 2/16/2022   Post-Op Appt Date: PATIENT TO SCHEDULE     Packet sent out: Yes  Pre-cert/Authorization completed:  Not Applicable  Date: 2/18/2022         ROBOT-ASSISTED REPAIR OF MULTIPLE MIDLINE VENTRAL HERNIAS      GENERAL H&P DONE IN HOUSE 150 MIN REQ PA ASSIST MGB NMS

## 2022-02-18 NOTE — LETTER
[facility]    [address], [address]  [place], [place] [address]  Phone [phone]  Fax [phone] [address], [address]  [facility] [address]  Phone [phone]  Fax [phone]    [email or web address]   February 18, 2022    Isa POLINA Rafita  [address] [address]    We realize with scheduling surgery, one of your first questions is, how much will this cost?  Below we have provided you with the information you will need to receive an estimate for your surgery.    You are scheduled for the following procedure:  ROBOT-ASSISTED REPAIR OF MULTIPLE MIDLINE VENTRAL HERNIAS          Surgeon:  Lacey Munroe MD      Physician Assistant:  Yes      Please make sure to have your insurance card available at the time of calling.    Surgeon & Physician Assistant charges and facility charges for [facility], [facility] or [facility]:    Consumer Price Line at [phone]   -  It is important to note that there may be a Physician Assistant assisting with your surgery.  Please be sure to mention this when calling for the estimate.      If you prefer, you can also request a price estimate online by completing the secure form at:  [email or web address]    Facility Charges at [facility] [place], [facility] [place] or [facility]:  [facility] at [phone]  [facility] [place] at [phone]  [facility] at [phone] or [phone]    Anesthesiologist Charges:  [facility] at [phone]    [facility] - Nurse Anesthetist Charges:  [facility] at [phone]

## 2022-03-02 RX ORDER — AMLODIPINE BESYLATE 2.5 MG/1
2.5 TABLET ORAL DAILY
COMMUNITY
End: 2023-12-29

## 2022-03-05 ENCOUNTER — LAB (OUTPATIENT)
Dept: URGENT CARE | Facility: URGENT CARE | Age: 62
End: 2022-03-05
Payer: COMMERCIAL

## 2022-03-05 DIAGNOSIS — Z11.59 ENCOUNTER FOR SCREENING FOR OTHER VIRAL DISEASES: ICD-10-CM

## 2022-03-05 PROCEDURE — U0003 INFECTIOUS AGENT DETECTION BY NUCLEIC ACID (DNA OR RNA); SEVERE ACUTE RESPIRATORY SYNDROME CORONAVIRUS 2 (SARS-COV-2) (CORONAVIRUS DISEASE [COVID-19]), AMPLIFIED PROBE TECHNIQUE, MAKING USE OF HIGH THROUGHPUT TECHNOLOGIES AS DESCRIBED BY CMS-2020-01-R: HCPCS

## 2022-03-05 PROCEDURE — U0005 INFEC AGEN DETEC AMPLI PROBE: HCPCS

## 2022-03-06 LAB — SARS-COV-2 RNA RESP QL NAA+PROBE: NEGATIVE

## 2022-03-09 ENCOUNTER — APPOINTMENT (OUTPATIENT)
Dept: SURGERY | Facility: PHYSICIAN GROUP | Age: 62
End: 2022-03-09
Payer: COMMERCIAL

## 2022-03-09 ENCOUNTER — HOSPITAL ENCOUNTER (OUTPATIENT)
Facility: CLINIC | Age: 62
Discharge: HOME | End: 2022-03-09
Attending: SURGERY | Admitting: SURGERY
Payer: COMMERCIAL

## 2022-03-09 ENCOUNTER — ANESTHESIA EVENT (OUTPATIENT)
Dept: SURGERY | Facility: CLINIC | Age: 62
End: 2022-03-09
Payer: COMMERCIAL

## 2022-03-09 ENCOUNTER — ANESTHESIA (OUTPATIENT)
Dept: SURGERY | Facility: CLINIC | Age: 62
End: 2022-03-09
Payer: COMMERCIAL

## 2022-03-09 VITALS
DIASTOLIC BLOOD PRESSURE: 67 MMHG | SYSTOLIC BLOOD PRESSURE: 138 MMHG | RESPIRATION RATE: 16 BRPM | OXYGEN SATURATION: 94 % | HEIGHT: 65 IN | TEMPERATURE: 97.8 F | WEIGHT: 182 LBS | BODY MASS INDEX: 30.32 KG/M2 | HEART RATE: 87 BPM

## 2022-03-09 DIAGNOSIS — Z98.890 S/P REPAIR OF VENTRAL HERNIA: Primary | ICD-10-CM

## 2022-03-09 DIAGNOSIS — Z87.19 S/P REPAIR OF VENTRAL HERNIA: Primary | ICD-10-CM

## 2022-03-09 PROCEDURE — 710N000012 HC RECOVERY PHASE 2, PER MINUTE: Performed by: SURGERY

## 2022-03-09 PROCEDURE — 370N000017 HC ANESTHESIA TECHNICAL FEE, PER MIN: Performed by: SURGERY

## 2022-03-09 PROCEDURE — 258N000003 HC RX IP 258 OP 636: Performed by: ANESTHESIOLOGY

## 2022-03-09 PROCEDURE — 250N000009 HC RX 250: Performed by: SURGERY

## 2022-03-09 PROCEDURE — 250N000011 HC RX IP 250 OP 636: Performed by: SURGERY

## 2022-03-09 PROCEDURE — C1781 MESH (IMPLANTABLE): HCPCS | Performed by: SURGERY

## 2022-03-09 PROCEDURE — 272N000001 HC OR GENERAL SUPPLY STERILE: Performed by: SURGERY

## 2022-03-09 PROCEDURE — 49652 PR LAP VENT/ABD HERNIA REPAIR: CPT | Mod: AS | Performed by: PHYSICIAN ASSISTANT

## 2022-03-09 PROCEDURE — 360N000080 HC SURGERY LEVEL 7, PER MIN: Performed by: SURGERY

## 2022-03-09 PROCEDURE — 250N000009 HC RX 250: Performed by: NURSE ANESTHETIST, CERTIFIED REGISTERED

## 2022-03-09 PROCEDURE — 250N000011 HC RX IP 250 OP 636: Performed by: ANESTHESIOLOGY

## 2022-03-09 PROCEDURE — 710N000009 HC RECOVERY PHASE 1, LEVEL 1, PER MIN: Performed by: SURGERY

## 2022-03-09 PROCEDURE — 999N000141 HC STATISTIC PRE-PROCEDURE NURSING ASSESSMENT: Performed by: SURGERY

## 2022-03-09 PROCEDURE — 250N000013 HC RX MED GY IP 250 OP 250 PS 637: Performed by: ANESTHESIOLOGY

## 2022-03-09 PROCEDURE — 49652 PR LAP VENT/ABD HERNIA REPAIR: CPT | Performed by: SURGERY

## 2022-03-09 PROCEDURE — 250N000011 HC RX IP 250 OP 636: Performed by: NURSE ANESTHETIST, CERTIFIED REGISTERED

## 2022-03-09 DEVICE — MESH COMPOSITE PARIETENE DS 20X15X1CM PPDS2015: Type: IMPLANTABLE DEVICE | Site: ABDOMEN | Status: FUNCTIONAL

## 2022-03-09 RX ORDER — LABETALOL 20 MG/4 ML (5 MG/ML) INTRAVENOUS SYRINGE
PRN
Status: DISCONTINUED | OUTPATIENT
Start: 2022-03-09 | End: 2022-03-09

## 2022-03-09 RX ORDER — MEPERIDINE HYDROCHLORIDE 25 MG/ML
12.5 INJECTION INTRAMUSCULAR; INTRAVENOUS; SUBCUTANEOUS
Status: DISCONTINUED | OUTPATIENT
Start: 2022-03-09 | End: 2022-03-09 | Stop reason: HOSPADM

## 2022-03-09 RX ORDER — OXYCODONE HYDROCHLORIDE 5 MG/1
5-10 TABLET ORAL EVERY 4 HOURS PRN
Qty: 20 TABLET | Refills: 0 | Status: SHIPPED | OUTPATIENT
Start: 2022-03-09 | End: 2022-03-16

## 2022-03-09 RX ORDER — FENTANYL CITRATE 50 UG/ML
25 INJECTION, SOLUTION INTRAMUSCULAR; INTRAVENOUS EVERY 5 MIN PRN
Status: DISCONTINUED | OUTPATIENT
Start: 2022-03-09 | End: 2022-03-09 | Stop reason: HOSPADM

## 2022-03-09 RX ORDER — NEOSTIGMINE METHYLSULFATE 1 MG/ML
VIAL (ML) INJECTION PRN
Status: DISCONTINUED | OUTPATIENT
Start: 2022-03-09 | End: 2022-03-09

## 2022-03-09 RX ORDER — LIDOCAINE HYDROCHLORIDE 10 MG/ML
INJECTION, SOLUTION EPIDURAL; INFILTRATION; INTRACAUDAL; PERINEURAL PRN
Status: DISCONTINUED | OUTPATIENT
Start: 2022-03-09 | End: 2022-03-09

## 2022-03-09 RX ORDER — ONDANSETRON 2 MG/ML
4 INJECTION INTRAMUSCULAR; INTRAVENOUS EVERY 30 MIN PRN
Status: DISCONTINUED | OUTPATIENT
Start: 2022-03-09 | End: 2022-03-09 | Stop reason: HOSPADM

## 2022-03-09 RX ORDER — OXYCODONE HYDROCHLORIDE 5 MG/1
5 TABLET ORAL EVERY 4 HOURS PRN
Status: DISCONTINUED | OUTPATIENT
Start: 2022-03-09 | End: 2022-03-09 | Stop reason: HOSPADM

## 2022-03-09 RX ORDER — ACETAMINOPHEN 325 MG/1
650 TABLET ORAL
Status: DISCONTINUED | OUTPATIENT
Start: 2022-03-09 | End: 2022-03-09 | Stop reason: HOSPADM

## 2022-03-09 RX ORDER — ACETAMINOPHEN 500 MG
1000 TABLET ORAL EVERY 6 HOURS PRN
COMMUNITY
Start: 2022-03-09 | End: 2023-12-29

## 2022-03-09 RX ORDER — LABETALOL HYDROCHLORIDE 5 MG/ML
10 INJECTION, SOLUTION INTRAVENOUS
Status: DISCONTINUED | OUTPATIENT
Start: 2022-03-09 | End: 2022-03-09 | Stop reason: HOSPADM

## 2022-03-09 RX ORDER — KETOROLAC TROMETHAMINE 30 MG/ML
INJECTION, SOLUTION INTRAMUSCULAR; INTRAVENOUS PRN
Status: DISCONTINUED | OUTPATIENT
Start: 2022-03-09 | End: 2022-03-09

## 2022-03-09 RX ORDER — PROPOFOL 10 MG/ML
INJECTION, EMULSION INTRAVENOUS CONTINUOUS PRN
Status: DISCONTINUED | OUTPATIENT
Start: 2022-03-09 | End: 2022-03-09

## 2022-03-09 RX ORDER — CEFAZOLIN SODIUM/WATER 2 G/20 ML
2 SYRINGE (ML) INTRAVENOUS
Status: COMPLETED | OUTPATIENT
Start: 2022-03-09 | End: 2022-03-09

## 2022-03-09 RX ORDER — DEXAMETHASONE SODIUM PHOSPHATE 4 MG/ML
INJECTION, SOLUTION INTRA-ARTICULAR; INTRALESIONAL; INTRAMUSCULAR; INTRAVENOUS; SOFT TISSUE PRN
Status: DISCONTINUED | OUTPATIENT
Start: 2022-03-09 | End: 2022-03-09

## 2022-03-09 RX ORDER — GLYCOPYRROLATE 0.2 MG/ML
INJECTION, SOLUTION INTRAMUSCULAR; INTRAVENOUS PRN
Status: DISCONTINUED | OUTPATIENT
Start: 2022-03-09 | End: 2022-03-09

## 2022-03-09 RX ORDER — HYDROMORPHONE HCL IN WATER/PF 6 MG/30 ML
0.2 PATIENT CONTROLLED ANALGESIA SYRINGE INTRAVENOUS EVERY 5 MIN PRN
Status: DISCONTINUED | OUTPATIENT
Start: 2022-03-09 | End: 2022-03-09 | Stop reason: HOSPADM

## 2022-03-09 RX ORDER — SENNA AND DOCUSATE SODIUM 50; 8.6 MG/1; MG/1
1-2 TABLET, FILM COATED ORAL 2 TIMES DAILY PRN
Qty: 20 TABLET | Refills: 0 | Status: SHIPPED | OUTPATIENT
Start: 2022-03-09 | End: 2023-12-29

## 2022-03-09 RX ORDER — PROPOFOL 10 MG/ML
INJECTION, EMULSION INTRAVENOUS PRN
Status: DISCONTINUED | OUTPATIENT
Start: 2022-03-09 | End: 2022-03-09

## 2022-03-09 RX ORDER — IBUPROFEN 200 MG
600 TABLET ORAL EVERY 6 HOURS PRN
Qty: 100 TABLET | Refills: 0 | COMMUNITY
Start: 2022-03-09 | End: 2023-12-29

## 2022-03-09 RX ORDER — SODIUM CHLORIDE, SODIUM LACTATE, POTASSIUM CHLORIDE, CALCIUM CHLORIDE 600; 310; 30; 20 MG/100ML; MG/100ML; MG/100ML; MG/100ML
INJECTION, SOLUTION INTRAVENOUS CONTINUOUS
Status: DISCONTINUED | OUTPATIENT
Start: 2022-03-09 | End: 2022-03-09 | Stop reason: HOSPADM

## 2022-03-09 RX ORDER — FENTANYL CITRATE 50 UG/ML
25 INJECTION, SOLUTION INTRAMUSCULAR; INTRAVENOUS
Status: DISCONTINUED | OUTPATIENT
Start: 2022-03-09 | End: 2022-03-09 | Stop reason: HOSPADM

## 2022-03-09 RX ORDER — HYDRALAZINE HYDROCHLORIDE 20 MG/ML
2.5-5 INJECTION INTRAMUSCULAR; INTRAVENOUS EVERY 10 MIN PRN
Status: DISCONTINUED | OUTPATIENT
Start: 2022-03-09 | End: 2022-03-09 | Stop reason: HOSPADM

## 2022-03-09 RX ORDER — BUPIVACAINE HYDROCHLORIDE 5 MG/ML
INJECTION, SOLUTION EPIDURAL; INTRACAUDAL PRN
Status: DISCONTINUED | OUTPATIENT
Start: 2022-03-09 | End: 2022-03-09 | Stop reason: HOSPADM

## 2022-03-09 RX ORDER — LIDOCAINE 40 MG/G
CREAM TOPICAL
Status: DISCONTINUED | OUTPATIENT
Start: 2022-03-09 | End: 2022-03-09 | Stop reason: HOSPADM

## 2022-03-09 RX ORDER — ONDANSETRON 4 MG/1
4 TABLET, ORALLY DISINTEGRATING ORAL EVERY 30 MIN PRN
Status: DISCONTINUED | OUTPATIENT
Start: 2022-03-09 | End: 2022-03-09 | Stop reason: HOSPADM

## 2022-03-09 RX ORDER — KETOROLAC TROMETHAMINE 15 MG/ML
15 INJECTION, SOLUTION INTRAMUSCULAR; INTRAVENOUS EVERY 6 HOURS PRN
Status: DISCONTINUED | OUTPATIENT
Start: 2022-03-09 | End: 2022-03-09 | Stop reason: HOSPADM

## 2022-03-09 RX ORDER — FENTANYL CITRATE 50 UG/ML
INJECTION, SOLUTION INTRAMUSCULAR; INTRAVENOUS PRN
Status: DISCONTINUED | OUTPATIENT
Start: 2022-03-09 | End: 2022-03-09

## 2022-03-09 RX ORDER — CEFAZOLIN SODIUM/WATER 2 G/20 ML
2 SYRINGE (ML) INTRAVENOUS SEE ADMIN INSTRUCTIONS
Status: DISCONTINUED | OUTPATIENT
Start: 2022-03-09 | End: 2022-03-09 | Stop reason: HOSPADM

## 2022-03-09 RX ORDER — ONDANSETRON 2 MG/ML
INJECTION INTRAMUSCULAR; INTRAVENOUS PRN
Status: DISCONTINUED | OUTPATIENT
Start: 2022-03-09 | End: 2022-03-09

## 2022-03-09 RX ORDER — OXYCODONE HYDROCHLORIDE 5 MG/1
5 TABLET ORAL
Status: DISCONTINUED | OUTPATIENT
Start: 2022-03-09 | End: 2022-03-09 | Stop reason: HOSPADM

## 2022-03-09 RX ADMIN — PROPOFOL 160 MG: 10 INJECTION, EMULSION INTRAVENOUS at 12:01

## 2022-03-09 RX ADMIN — LIDOCAINE HYDROCHLORIDE 50 MG: 10 INJECTION, SOLUTION EPIDURAL; INFILTRATION; INTRACAUDAL; PERINEURAL at 12:01

## 2022-03-09 RX ADMIN — ROCURONIUM BROMIDE 20 MG: 50 INJECTION, SOLUTION INTRAVENOUS at 14:27

## 2022-03-09 RX ADMIN — ROCURONIUM BROMIDE 20 MG: 50 INJECTION, SOLUTION INTRAVENOUS at 13:19

## 2022-03-09 RX ADMIN — GLYCOPYRROLATE 0.6 MG: 0.2 INJECTION, SOLUTION INTRAMUSCULAR; INTRAVENOUS at 15:14

## 2022-03-09 RX ADMIN — ONDANSETRON HYDROCHLORIDE 4 MG: 2 INJECTION, SOLUTION INTRAVENOUS at 15:09

## 2022-03-09 RX ADMIN — SODIUM CHLORIDE, POTASSIUM CHLORIDE, SODIUM LACTATE AND CALCIUM CHLORIDE 1000 ML: 600; 310; 30; 20 INJECTION, SOLUTION INTRAVENOUS at 10:39

## 2022-03-09 RX ADMIN — OXYCODONE HYDROCHLORIDE 5 MG: 5 TABLET ORAL at 16:01

## 2022-03-09 RX ADMIN — DEXAMETHASONE SODIUM PHOSPHATE 4 MG: 4 INJECTION, SOLUTION INTRA-ARTICULAR; INTRALESIONAL; INTRAMUSCULAR; INTRAVENOUS; SOFT TISSUE at 12:01

## 2022-03-09 RX ADMIN — Medication 2 G: at 11:50

## 2022-03-09 RX ADMIN — FENTANYL CITRATE 100 MCG: 50 INJECTION, SOLUTION INTRAMUSCULAR; INTRAVENOUS at 12:01

## 2022-03-09 RX ADMIN — SODIUM CHLORIDE, POTASSIUM CHLORIDE, SODIUM LACTATE AND CALCIUM CHLORIDE: 600; 310; 30; 20 INJECTION, SOLUTION INTRAVENOUS at 13:21

## 2022-03-09 RX ADMIN — MIDAZOLAM 2 MG: 1 INJECTION INTRAMUSCULAR; INTRAVENOUS at 11:51

## 2022-03-09 RX ADMIN — NEOSTIGMINE METHYLSULFATE 4 MG: 1 INJECTION, SOLUTION INTRAVENOUS at 15:14

## 2022-03-09 RX ADMIN — LABETALOL 20 MG/4 ML (5 MG/ML) INTRAVENOUS SYRINGE 10 MG: at 13:57

## 2022-03-09 RX ADMIN — HYDROMORPHONE HYDROCHLORIDE 0.5 MG: 1 INJECTION, SOLUTION INTRAMUSCULAR; INTRAVENOUS; SUBCUTANEOUS at 13:20

## 2022-03-09 RX ADMIN — HYDROMORPHONE HYDROCHLORIDE 0.5 MG: 1 INJECTION, SOLUTION INTRAMUSCULAR; INTRAVENOUS; SUBCUTANEOUS at 12:25

## 2022-03-09 RX ADMIN — KETOROLAC TROMETHAMINE 30 MG: 30 INJECTION, SOLUTION INTRAMUSCULAR at 15:11

## 2022-03-09 RX ADMIN — FENTANYL CITRATE 25 MCG: 50 INJECTION, SOLUTION INTRAMUSCULAR; INTRAVENOUS at 15:45

## 2022-03-09 RX ADMIN — LABETALOL 20 MG/4 ML (5 MG/ML) INTRAVENOUS SYRINGE 10 MG: at 13:43

## 2022-03-09 RX ADMIN — PROPOFOL 75 MCG/KG/MIN: 10 INJECTION, EMULSION INTRAVENOUS at 12:02

## 2022-03-09 RX ADMIN — ROCURONIUM BROMIDE 50 MG: 50 INJECTION, SOLUTION INTRAVENOUS at 12:01

## 2022-03-09 RX ADMIN — HYDROMORPHONE HYDROCHLORIDE 0.5 MG: 1 INJECTION, SOLUTION INTRAMUSCULAR; INTRAVENOUS; SUBCUTANEOUS at 12:33

## 2022-03-09 RX ADMIN — GLYCOPYRROLATE 0.2 MG: 0.2 INJECTION, SOLUTION INTRAMUSCULAR; INTRAVENOUS at 12:01

## 2022-03-09 NOTE — ANESTHESIA PREPROCEDURE EVALUATION
Anesthesia Pre-Procedure Evaluation    Patient: Isa Eller   MRN: [id number] : 1960        Procedure : Procedure(s):  ROBOT-ASSISTED REPAIR OF MULTIPLE MIDLINE VENTRAL HERNIAS, USING DA THERESA XI          Past Medical History:   Diagnosis Date     Anxiety      Clotting disorder (H)      Hyperlipemia      Hypertension      PAD (peripheral artery disease) (H)      Thrombosis       Past Surgical History:   Procedure Laterality Date     BREAST SURGERY       BYPASS GRAFT AORTOILIAC  2018     LAPAROSCOPIC CHOLECYSTECTOMY WITH CHOLANGIOGRAMS N/A 10/19/2014    Procedure: LAPAROSCOPIC CHOLECYSTECTOMY WITH CHOLANGIOGRAMS;  Surgeon: Seamus Holman MD;  Location: [facility] OR     VASCULAR SURGERY        Allergies   Allergen Reactions     Sunflower Oil Difficulty breathing     Lisinopril Cough      Social History     Tobacco Use     Smoking status: Former Smoker     Smokeless tobacco: Never Used     Tobacco comment: Vapes   Substance Use Topics     Alcohol use: Yes      Wt Readings from Last 1 Encounters:   22 82.6 kg (182 lb)        Anesthesia Evaluation   Pt has had prior anesthetic. Type: General.        ROS/MED HX  ENT/Pulmonary:  - neg pulmonary ROS     Neurologic:  - neg neurologic ROS     Cardiovascular:     (+) Dyslipidemia hypertension-Peripheral Vascular Disease----    METS/Exercise Tolerance:     Hematologic:     (+) History of blood clots,     Musculoskeletal: Comment: hernias      GI/Hepatic:  - neg GI/hepatic ROS     Renal/Genitourinary:  - neg Renal ROS     Endo:  - neg endo ROS     Psychiatric/Substance Use:     (+) psychiatric history anxiety     Infectious Disease:  - neg infectious disease ROS     Malignancy:  - neg malignancy ROS     Other:  - neg other ROS          Physical Exam    Airway        Mallampati: II   TM distance: > 3 FB   Neck ROM: full   Mouth opening: > 3 cm    Respiratory Devices and Support         Dental  no notable dental history         Cardiovascular   cardiovascular exam  normal          Pulmonary   pulmonary exam normal                OUTSIDE LABS:  CBC:   Lab Results   Component Value Date    WBC 4.5 02/16/2022    WBC 7.1 02/15/2022    HGB 12.0 02/16/2022    HGB 11.7 02/16/2022    HCT 35.6 02/16/2022    HCT 37.6 02/15/2022     02/16/2022     02/15/2022     BMP:   Lab Results   Component Value Date     02/16/2022     02/15/2022    POTASSIUM 3.8 02/16/2022    POTASSIUM 4.1 02/15/2022    CHLORIDE 106 02/16/2022    CHLORIDE 108 02/15/2022    CO2 25 02/16/2022    CO2 25 02/15/2022    BUN 5 (L) 02/16/2022    BUN 8 02/15/2022    CR 0.86 02/16/2022    CR 1.06 (H) 02/15/2022     (H) 02/16/2022    GLC 99 02/15/2022     COAGS:   Lab Results   Component Value Date    INR 0.94 02/14/2022     POC: No results found for: BGM, HCG, HCGS  HEPATIC:   Lab Results   Component Value Date    ALBUMIN 3.5 02/15/2022    PROTTOTAL 6.8 02/15/2022    ALT 55 (H) 02/15/2022    AST 56 (H) 02/15/2022    ALKPHOS 91 02/15/2022    BILITOTAL 0.7 02/15/2022     OTHER:   Lab Results   Component Value Date    LACT 1.1 02/14/2022    A1C 6.0 10/20/2014    JOSE 8.6 02/16/2022    MAG 1.9 10/07/2020    LIPASE 164 10/07/2020       Anesthesia Plan    ASA Status:  3      Anesthesia Type: General.     - Airway: ETT   Induction: Intravenous, Propofol.   Maintenance: Balanced.        Consents    Anesthesia Plan(s) and associated risks, benefits, and realistic alternatives discussed. Questions answered and patient/representative(s) expressed understanding.    - Discussed:     - Discussed with:  Patient      - Extended Intubation/Ventilatory Support Discussed: No.      - Patient is DNR/DNI Status: No    Use of blood products discussed: Yes.     - Discussed with: Patient.     - Consented: consented to blood products            Reason for refusal: other.     Postoperative Care    Pain management: IV analgesics.   PONV prophylaxis: Ondansetron (or other 5HT-3), Dexamethasone or Solumedrol      Comments:                Patel Brice MD

## 2022-03-09 NOTE — ANESTHESIA POSTPROCEDURE EVALUATION
Patient: Isa Eller    Procedure: Procedure(s):  ROBOT-ASSISTED REPAIR OF MULTIPLE MIDLINE VENTRAL HERNIAS, USING DA THERESA XI       Anesthesia Type:  General    Note:     Postop Pain Control: Uneventful            Sign Out: Well controlled pain   PONV: No   Neuro/Psych: Uneventful            Sign Out: Acceptable/Baseline neuro status   Airway/Respiratory: Uneventful            Sign Out: Acceptable/Baseline resp. status   CV/Hemodynamics: Uneventful            Sign Out: Acceptable CV status; No obvious hypovolemia; No obvious fluid overload   Other NRE: NONE   DID A NON-ROUTINE EVENT OCCUR? No           Last vitals:  Vitals Value Taken Time   /84 03/09/22 1633   Temp 98.5  F (36.9  C) 03/09/22 1615   Pulse 80 03/09/22 1639   Resp 12 03/09/22 1639   SpO2 94 % 03/09/22 1640   Vitals shown include unvalidated device data.    Electronically Signed By: Patel Brice MD  March 9, 2022  5:31 PM

## 2022-03-09 NOTE — ANESTHESIA CARE TRANSFER NOTE
Patient: Isa Eller    Procedure: Procedure(s):  ROBOT-ASSISTED REPAIR OF MULTIPLE MIDLINE VENTRAL HERNIAS, USING DA THERESA XI       Diagnosis: Ventral hernia without obstruction or gangrene [K43.9]  Diagnosis Additional Information: No value filed.    Anesthesia Type:   General     Note:    Oropharynx: oropharynx clear of all foreign objects  Level of Consciousness: awake  Oxygen Supplementation: face mask  Level of Supplemental Oxygen (L/min / FiO2): 6 lpm  Independent Airway: airway patency satisfactory and stable  Dentition: dentition unchanged  Vital Signs Stable: post-procedure vital signs reviewed and stable  Report to RN Given: handoff report given  Patient transferred to: PACU  Comments: Patient oral suctioned. Patient with spontaneous respirations and adequate tidal volumes. Patient awake and responsive. Extubated in OR to 6L facemask. To PACU ventilating well. VSS. Report given.  Handoff Report: Identifed the Patient, Identified the Reponsible Provider, Reviewed the pertinent medical history, Discussed the surgical course, Reviewed Intra-OP anesthesia mangement and issues during anesthesia, Set expectations for post-procedure period and Allowed opportunity for questions and acknowledgement of understanding      Vitals:  Vitals Value Taken Time   /73 03/09/22 1528   Temp 97  F (36.1  C) 03/09/22 1526   Pulse 94 03/09/22 1530   Resp 18 03/09/22 1530   SpO2 93 % 03/09/22 1530   Vitals shown include unvalidated device data.    Electronically Signed By: ZEUS Huerta CRNA  March 9, 2022  3:31 PM

## 2022-03-09 NOTE — ANESTHESIA PROCEDURE NOTES
Airway       Patient location during procedure: OR       Procedure Start/Stop Times: 3/9/2022 12:01 PM  Staff -        Anesthesiologist:  Patel Brice MD       CRNA: Shanthi Garcia APRN CRNA       Performed By: CRNA  Consent for Airway        Urgency: elective  Indications and Patient Condition       Indications for airway management: maggie-procedural       Induction type:intravenous       Mask difficulty assessment: 2 - vent by mask + OA or adjuvant +/- NMBA    Final Airway Details       Final airway type: endotracheal airway       Successful airway: ETT - single  Endotracheal Airway Details        ETT size (mm): 7.0       Cuffed: yes       Successful intubation technique: direct laryngoscopy       DL Blade Type: Farrar 2       Grade View of Cords: 1       Adjucts: stylet       Position: Right       Measured from: lips       Secured at (cm): 21       Bite block used: None    Post intubation assessment        Placement verified by: capnometry, equal breath sounds and chest rise        Number of attempts at approach: 1       Number of other approaches attempted: 0       Secured with: plastic tape       Ease of procedure: easy       Dentition: Intact and Unchanged

## 2022-03-09 NOTE — ANESTHESIA POSTPROCEDURE EVALUATION
Patient: Isa Eller    Procedure: Procedure(s):  ROBOT-ASSISTED REPAIR OF MULTIPLE MIDLINE VENTRAL HERNIAS, USING DA THERESA XI       Anesthesia Type:  General    Note:     Postop Pain Control: Uneventful            Sign Out: Well controlled pain   PONV: No   Neuro/Psych: Uneventful            Sign Out: Acceptable/Baseline neuro status   Airway/Respiratory: Uneventful            Sign Out: Acceptable/Baseline resp. status   CV/Hemodynamics: Uneventful            Sign Out: Acceptable CV status; No obvious hypovolemia; No obvious fluid overload   Other NRE: NONE   DID A NON-ROUTINE EVENT OCCUR? No           Last vitals:  Vitals Value Taken Time   /84 03/09/22 1633   Temp 98.5  F (36.9  C) 03/09/22 1615   Pulse 80 03/09/22 1639   Resp 12 03/09/22 1639   SpO2 94 % 03/09/22 1640   Vitals shown include unvalidated device data.    Electronically Signed By: Patel Brice MD  March 9, 2022  5:29 PM

## 2022-03-09 NOTE — DISCHARGE INSTRUCTIONS
GENERAL ANESTHESIA OR SEDATION ADULT DISCHARGE INSTRUCTIONS   SPECIAL PRECAUTIONS FOR 24 HOURS AFTER SURGERY    IT IS NOT UNUSUAL TO FEEL LIGHT-HEADED OR FAINT, UP TO 24 HOURS AFTER SURGERY OR WHILE TAKING PAIN MEDICATION.  IF YOU HAVE THESE SYMPTOMS; SIT FOR A FEW MINUTES BEFORE STANDING AND HAVE SOMEONE ASSIST YOU WHEN YOU GET UP TO WALK OR USE THE BATHROOM.    YOU SHOULD REST AND RELAX FOR THE NEXT 24 HOURS AND YOU MUST MAKE ARRANGEMENTS TO HAVE SOMEONE STAY WITH YOU FOR AT LEAST 24 HOURS AFTER YOUR DISCHARGE.  AVOID HAZARDOUS AND STRENUOUS ACTIVITIES.  DO NOT MAKE IMPORTANT DECISIONS FOR 24 HOURS.    DO NOT DRIVE ANY VEHICLE OR OPERATE MECHANICAL EQUIPMENT FOR 24 HOURS FOLLOWING THE END OF YOUR SURGERY.  EVEN THOUGH YOU MAY FEEL NORMAL, YOUR REACTIONS MAY BE AFFECTED BY THE MEDICATION YOU HAVE RECEIVED.    DO NOT DRINK ALCOHOLIC BEVERAGES FOR 24 HOURS FOLLOWING YOUR SURGERY.    DRINK CLEAR LIQUIDS (APPLE JUICE, GINGER ALE, 7-UP, BROTH, ETC.).  PROGRESS TO YOUR REGULAR DIET AS YOU FEEL ABLE.    YOU MAY HAVE A DRY MOUTH, A SORE THROAT, MUSCLES ACHES OR TROUBLE SLEEPING.  THESE SHOULD GO AWAY AFTER 24 HOURS.    CALL YOUR DOCTOR FOR ANY OF THE FOLLOWING:  SIGNS OF INFECTION (FEVER, GROWING TENDERNESS AT THE SURGERY SITE, A LARGE AMOUNT OF DRAINAGE OR BLEEDING, SEVERE PAIN, FOUL-SMELLING DRAINAGE, REDNESS OR SWELLING.    IT HAS BEEN OVER 8 TO 10 HOURS SINCE SURGERY AND YOU ARE STILL NOT ABLE TO URINATE (PASS WATER).     HOME CARE FOLLOWING UMBILICAL/VENTRAL HERNIA REPAIR  ISRAEL Crabtree, MORENA Montiel, TRICIA Fung, EMILY Painter    DIET:  Start with liquids and gradually resume your regular diet as tolerated.  Increased fluid intake is recommended. While taking pain medications, consider use of a stool softener, increase your fiber in your diet, or add a fiber supplement (like Metamucil, Citrucel) to help prevent constipation - a possible side effect of pain medications.    NAUSEA:  If nauseated  "from the anesthetic/pain meds; rest in bed, get up cautiously with assistance, and drink clear liquids (juice, tea, broth).    ACTIVITY:  Light Activity -- you may immediately be up and about as tolerated.  Walking is encouraged, increase as tolerated.  Driving/Light Work -- when comfortable and off narcotic pain medications.  Strenuous Work/Activity -- limit lifting to 20 pounds for 3 weeks.  Active Sports (running, biking, etc.) -- cautiously resume after 4 weeks.    INCISIONAL CARE:    If you have a dressing in place, keep clean and dry for 48 hours after surgery.  After this timeframe, you may replace the gauze daily if it becomes soiled.    You may remove the dressing and shower 48 hours after surgery.  Do not submerse incision in water for 1 week.    Sutures will absorb and need not be removed.    If present, leave the steri-strips (white paper tapes) in place for 14 days after surgery.    Expect a variable amount of swelling/bruising/discoloration that may appear around or below the repair site.    Some numbness around the incision is common.    A lump/\"healing ridge\" under the incision is normal and will gradually resolve over the following 1-2 months.    DISCOMFORT:  Local anesthetic placed at surgery should provide relief for 4-8 hours.  Begin taking pain pills before discomfort is severe.  Take the pain medication with some food, when possible, to minimize side effects.  Intermittent use of ice packs to the hernia repair site may help during the first 1-3 weeks after surgery.  Expect gradual improvement.    Over-the-counter anti-inflammatory medications (i.e. Ibuprofen/Advil/Motrin or Naprosyn/Aleve) may be used per package instructions in addition to or while tapering off the narcotic pain medications to decrease swelling and sensitivity at the repair site.  DO NOT TAKE these Anti-inflammatory medications if your primary physician has advised against doing so, or if you have acid reflux, ulcer, or " "bleeding disorder, or take blood-thinner medications.  Call your primary physician or the surgery office if you have medication questions.      FOLLOW-UP AFTER SURGERY:  Our office will contact you approximately 2-3 weeks after surgery to check on your progress and answer any questions you may have.  If you are doing well, you will not need to return for an office appointment.  If any concerns are identified over the phone, we will help you make an appointment to see a provider.  If you have not received a phone call, have any questions or concerns, or would like to be seen, please call us at [phone].  We are located at [address], [facility] [address], [place] [address].     CONTACT US IF THE FOLLOWING DEVELOPS:              1. A fever that is above 101     2. Increased redness, warmth, drainage, bleeding, or swelling.   3. Pain that is not relieved by rest/ice and your prescription.   4.  Increasing pain after 48 hours.   5. Drainage that is thick, cloudy, yellow, green or white.   6. Any other questions or concerns not addressed on the \"Frequently Asked Questions\" sheet.      You received Toradol, an IV form of Ibuprofen (Motrin) at 3:15pm*.  Do not take any Ibuprofen products until 9:15pm*.    Maximum acetaminophen (Tylenol) dose from all sources should not exceed 4 grams (4000 mg) per day.          "

## 2022-03-09 NOTE — OP NOTE
[facility] General Surgery Operative Note    Pre-operative diagnosis: Multiple midline Ventral hernias   Post-operative diagnosis: Same    Procedure: Robotic assisted laparoscopic IPOM ventral hernia repair   Surgeon: Lacey Munroe MD   Assistant(s): Mary Finley PA-C  The Physician Assistant was medically necessary for their expertise in prepping, camera management, suctioning, suturing and retraction.   Anesthesia: general   Estimated blood loss:  Specimen:  FINDINGS:  25 cc  none  8 ventral hernias along previous midline incision        Indication for Procedure: This is a 61 year old female who presented to the hospital with a symptomatic ventral hernia. At that time, she had a bowel obstruction from a loop of bowel within one of the hernias. It was reducible and she did not require emergent repair. We discussed approaches to management and mutually agreed on a robotic approach for this patient.    Description of procedure:  Patient was brought to the operating room, placed on the operating table in supine position. Anesthesia was induced. Her  pressure points were padded and she was secured with a safety strap. A timeout was called to verify the patient, site of procedure and procedure to be performed.    The abdomen was entered with a optiview trocar in the left upper quadrant under direct visualization. Pneumoperitoneum was established. The abdomen was surveyed and we found copious omental adhesions to the anterior abdominal wall. .  Three 8mm ports were then placed in the right lateral abdomen, after marking out the dimensions of the hernia with the lower port being at least 2 fingers off the ASIS and the upper port being at least 2 fingers off the ribcage. The robot was then docked. With a monopolar scissors in the right hand and force bipolar in the left hand,  We performed lysis of adhesions for 30 minutes to clear the anterior abdominal wall. The contents of the multiple midline hernias were  reduced into the abdomen. We counted a total of 8 ventral hernia; three of the defects were 3 cm in diameter and the other 8 were smaller.  The defects spanned a superior to inferior length of 10 cm.  A running 0 Strattafix was then used to close the fascial defects. The length of the sutured portion was 15 cm. A 20 x 15 cm piece of Parietene DS mesh was chosen and trimmed to 20 x 10 cm. This was secured into place in an intraperitoneal location, sewn circumferentially with running 3-0 V-lock suture. The mesh lay flat at the conclusion.  The cavity was inspected and there was adequate hemostasis. The trocars were then removed. The skin was closed with 4-0 vicryl suture. Sterile dressings were applied. At the end of the operation, all sponge, instrument, and needle counts were correct.     Lacey Munroe MD

## 2022-03-16 DIAGNOSIS — Z87.19 S/P REPAIR OF VENTRAL HERNIA: ICD-10-CM

## 2022-03-16 DIAGNOSIS — Z98.890 S/P REPAIR OF VENTRAL HERNIA: ICD-10-CM

## 2022-03-16 RX ORDER — OXYCODONE HYDROCHLORIDE 5 MG/1
5-10 TABLET ORAL EVERY 4 HOURS PRN
Qty: 20 TABLET | Refills: 0 | Status: SHIPPED | OUTPATIENT
Start: 2022-03-16 | End: 2023-12-29

## 2022-03-16 NOTE — TELEPHONE ENCOUNTER
Surgery Type/Date:  Robotic assisted laparoscopic IPOM ventral hernia repair, 3/9/22.  Dr. Munroe      Patient medication request:  Oxycodone 5mg  Refill request: first  Type/Amount of pain medication in current use:  Tylenol 1000mg every 6 hours.  Ran out of oxycodone 2 days ago.   Patient Symptoms:  Patient complains of incisional/ surgical site pain, worse with movement. She had been taking the Tylenol with oxycodone before she ran out.  The Tylenol alone does not help much with her pain.  We discussed she is ok to take oxycodone with Tylenol and alternate with ibuprofen as prescribed to help increase time between doses.  With this refill she can taper the oxycodone and continue to alternate Tylenol and ibuprofen only.    +BM's, she is taking senna as prescribed.  She would like rx to go to [facility] in [place].

## 2022-03-31 ENCOUNTER — TELEPHONE (OUTPATIENT)
Dept: SURGERY | Facility: CLINIC | Age: 62
End: 2022-03-31
Payer: COMMERCIAL

## 2022-03-31 NOTE — TELEPHONE ENCOUNTER
[facility]  Post op call note     Isa Eller was called for an update regarding her recovery.  She underwent a robotic ventral hernia repair by Dr. Munroe on March / 09 / 2022  Today she tells me she is doing well and denies any complaints. She has noticed an aversion to foods. She has found that most foods just taste icky and smell bad.  She is able to tolerate most fluids and is keeping well hydrated. She is having regular bowel movements.  Denies abdominal pain.  She states her wounds are healing well.     Advised that food aversion should resolve with time.  If symptoms do not resolve over the next 4-6 weeks, recommend follow up with PCP. She was instructed to slowly and gradually resume all normal activities. She states all of her questions were answered.  She understands our discussion.  She agrees to follow up as needed or to call our office with any concerns.    Mary Finley PA-C      Please route or send letter to:  Primary Care Provider (PCP)            Food aversion - all icky tastes and smells bad

## 2022-05-15 ENCOUNTER — HEALTH MAINTENANCE LETTER (OUTPATIENT)
Age: 62
End: 2022-05-15

## 2022-09-10 ENCOUNTER — HEALTH MAINTENANCE LETTER (OUTPATIENT)
Age: 62
End: 2022-09-10

## 2023-04-30 ENCOUNTER — HEALTH MAINTENANCE LETTER (OUTPATIENT)
Age: 63
End: 2023-04-30

## 2023-06-03 ENCOUNTER — HEALTH MAINTENANCE LETTER (OUTPATIENT)
Age: 63
End: 2023-06-03

## 2023-12-10 ENCOUNTER — HEALTH MAINTENANCE LETTER (OUTPATIENT)
Age: 63
End: 2023-12-10

## 2023-12-29 ENCOUNTER — APPOINTMENT (OUTPATIENT)
Dept: CT IMAGING | Facility: CLINIC | Age: 63
DRG: 683 | End: 2023-12-29
Attending: EMERGENCY MEDICINE
Payer: COMMERCIAL

## 2023-12-29 ENCOUNTER — HOSPITAL ENCOUNTER (INPATIENT)
Facility: CLINIC | Age: 63
LOS: 2 days | Discharge: HOME | DRG: 683 | End: 2023-12-31
Attending: EMERGENCY MEDICINE | Admitting: HOSPITALIST
Payer: COMMERCIAL

## 2023-12-29 ENCOUNTER — APPOINTMENT (OUTPATIENT)
Dept: GENERAL RADIOLOGY | Facility: CLINIC | Age: 63
DRG: 683 | End: 2023-12-29
Attending: EMERGENCY MEDICINE
Payer: COMMERCIAL

## 2023-12-29 ENCOUNTER — APPOINTMENT (OUTPATIENT)
Dept: ULTRASOUND IMAGING | Facility: CLINIC | Age: 63
DRG: 683 | End: 2023-12-29
Attending: HOSPITALIST
Payer: COMMERCIAL

## 2023-12-29 DIAGNOSIS — A41.9 SEPSIS, DUE TO UNSPECIFIED ORGANISM, UNSPECIFIED WHETHER ACUTE ORGAN DYSFUNCTION PRESENT (H): ICD-10-CM

## 2023-12-29 DIAGNOSIS — N18.9 ACUTE KIDNEY INJURY SUPERIMPOSED ON CKD (H): ICD-10-CM

## 2023-12-29 DIAGNOSIS — R63.39 FOOD AVERSION: Primary | ICD-10-CM

## 2023-12-29 DIAGNOSIS — N17.9 ACUTE KIDNEY INJURY SUPERIMPOSED ON CKD (H): ICD-10-CM

## 2023-12-29 LAB
ALBUMIN SERPL BCG-MCNC: 3.5 G/DL (ref 3.5–5.2)
ALBUMIN UR-MCNC: 30 MG/DL
ALP SERPL-CCNC: 231 U/L (ref 40–150)
ALT SERPL W P-5'-P-CCNC: 111 U/L (ref 0–50)
ANION GAP SERPL CALCULATED.3IONS-SCNC: 18 MMOL/L (ref 7–15)
APAP SERPL-MCNC: <5 UG/ML (ref 10–30)
APPEARANCE UR: CLEAR
AST SERPL W P-5'-P-CCNC: 99 U/L (ref 0–45)
BACTERIA #/AREA URNS HPF: ABNORMAL /HPF
BASOPHILS # BLD AUTO: 0 10E3/UL (ref 0–0.2)
BASOPHILS NFR BLD AUTO: 1 %
BILIRUB SERPL-MCNC: 0.7 MG/DL
BILIRUB UR QL STRIP: NEGATIVE
BUN SERPL-MCNC: 24.1 MG/DL (ref 8–23)
CALCIUM SERPL-MCNC: 8.9 MG/DL (ref 8.8–10.2)
CHLORIDE SERPL-SCNC: 91 MMOL/L (ref 98–107)
COLOR UR AUTO: ABNORMAL
CREAT SERPL-MCNC: 3.8 MG/DL (ref 0.51–0.95)
DEPRECATED HCO3 PLAS-SCNC: 21 MMOL/L (ref 22–29)
EGFRCR SERPLBLD CKD-EPI 2021: 13 ML/MIN/1.73M2
EOSINOPHIL # BLD AUTO: 0 10E3/UL (ref 0–0.7)
EOSINOPHIL NFR BLD AUTO: 0 %
ERYTHROCYTE [DISTWIDTH] IN BLOOD BY AUTOMATED COUNT: 13.7 % (ref 10–15)
FLUAV RNA SPEC QL NAA+PROBE: NEGATIVE
FLUBV RNA RESP QL NAA+PROBE: NEGATIVE
GLUCOSE SERPL-MCNC: 129 MG/DL (ref 70–99)
GLUCOSE UR STRIP-MCNC: NEGATIVE MG/DL
HAV IGM SERPL QL IA: NONREACTIVE
HBV CORE IGM SERPL QL IA: NONREACTIVE
HBV SURFACE AG SERPL QL IA: NONREACTIVE
HCT VFR BLD AUTO: 38.3 % (ref 35–47)
HCV AB SERPL QL IA: NONREACTIVE
HGB BLD-MCNC: 12.9 G/DL (ref 11.7–15.7)
HGB UR QL STRIP: ABNORMAL
HOLD SPECIMEN: NORMAL
HOLD SPECIMEN: NORMAL
IMM GRANULOCYTES # BLD: 0 10E3/UL
IMM GRANULOCYTES NFR BLD: 1 %
KETONES UR STRIP-MCNC: NEGATIVE MG/DL
LACTATE SERPL-SCNC: 0.8 MMOL/L (ref 0.7–2)
LACTATE SERPL-SCNC: 1 MMOL/L (ref 0.7–2)
LACTATE SERPL-SCNC: 3.2 MMOL/L (ref 0.7–2)
LEUKOCYTE ESTERASE UR QL STRIP: ABNORMAL
LIPASE SERPL-CCNC: 110 U/L (ref 13–60)
LYMPHOCYTES # BLD AUTO: 0.8 10E3/UL (ref 0.8–5.3)
LYMPHOCYTES NFR BLD AUTO: 11 %
MAGNESIUM SERPL-MCNC: 1.7 MG/DL (ref 1.7–2.3)
MCH RBC QN AUTO: 31.1 PG (ref 26.5–33)
MCHC RBC AUTO-ENTMCNC: 33.7 G/DL (ref 31.5–36.5)
MCV RBC AUTO: 92 FL (ref 78–100)
MONOCYTES # BLD AUTO: 0.9 10E3/UL (ref 0–1.3)
MONOCYTES NFR BLD AUTO: 13 %
NEUTROPHILS # BLD AUTO: 5.3 10E3/UL (ref 1.6–8.3)
NEUTROPHILS NFR BLD AUTO: 74 %
NITRATE UR QL: NEGATIVE
NRBC # BLD AUTO: 0 10E3/UL
NRBC BLD AUTO-RTO: 0 /100
NT-PROBNP SERPL-MCNC: 726 PG/ML (ref 0–900)
PH UR STRIP: 6.5 [PH] (ref 5–7)
PHOSPHATE SERPL-MCNC: 3.3 MG/DL (ref 2.5–4.5)
PLATELET # BLD AUTO: 153 10E3/UL (ref 150–450)
POTASSIUM SERPL-SCNC: 2.9 MMOL/L (ref 3.4–5.3)
PROCALCITONIN SERPL IA-MCNC: 1.16 NG/ML
PROT SERPL-MCNC: 6.7 G/DL (ref 6.4–8.3)
RBC # BLD AUTO: 4.15 10E6/UL (ref 3.8–5.2)
RBC URINE: 1 /HPF
RSV RNA SPEC NAA+PROBE: NEGATIVE
SARS-COV-2 RNA RESP QL NAA+PROBE: POSITIVE
SODIUM SERPL-SCNC: 130 MMOL/L (ref 135–145)
SODIUM UR-SCNC: 32 MMOL/L
SP GR UR STRIP: 1 (ref 1–1.03)
SQUAMOUS EPITHELIAL: 2 /HPF
TROPONIN T SERPL HS-MCNC: 15 NG/L
UROBILINOGEN UR STRIP-MCNC: NORMAL MG/DL
WBC # BLD AUTO: 7.1 10E3/UL (ref 4–11)
WBC URINE: 2 /HPF

## 2023-12-29 PROCEDURE — 83880 ASSAY OF NATRIURETIC PEPTIDE: CPT | Performed by: EMERGENCY MEDICINE

## 2023-12-29 PROCEDURE — 80143 DRUG ASSAY ACETAMINOPHEN: CPT | Performed by: EMERGENCY MEDICINE

## 2023-12-29 PROCEDURE — 258N000003 HC RX IP 258 OP 636: Performed by: EMERGENCY MEDICINE

## 2023-12-29 PROCEDURE — 93005 ELECTROCARDIOGRAM TRACING: CPT

## 2023-12-29 PROCEDURE — 36415 COLL VENOUS BLD VENIPUNCTURE: CPT | Performed by: EMERGENCY MEDICINE

## 2023-12-29 PROCEDURE — 74176 CT ABD & PELVIS W/O CONTRAST: CPT

## 2023-12-29 PROCEDURE — 96365 THER/PROPH/DIAG IV INF INIT: CPT | Mod: 59

## 2023-12-29 PROCEDURE — 86705 HEP B CORE ANTIBODY IGM: CPT | Performed by: HOSPITALIST

## 2023-12-29 PROCEDURE — 96360 HYDRATION IV INFUSION INIT: CPT

## 2023-12-29 PROCEDURE — 250N000013 HC RX MED GY IP 250 OP 250 PS 637: Performed by: HOSPITALIST

## 2023-12-29 PROCEDURE — 250N000011 HC RX IP 250 OP 636: Performed by: EMERGENCY MEDICINE

## 2023-12-29 PROCEDURE — 81001 URINALYSIS AUTO W/SCOPE: CPT | Performed by: EMERGENCY MEDICINE

## 2023-12-29 PROCEDURE — 99285 EMERGENCY DEPT VISIT HI MDM: CPT | Mod: 25

## 2023-12-29 PROCEDURE — 82040 ASSAY OF SERUM ALBUMIN: CPT | Performed by: EMERGENCY MEDICINE

## 2023-12-29 PROCEDURE — 83605 ASSAY OF LACTIC ACID: CPT | Performed by: EMERGENCY MEDICINE

## 2023-12-29 PROCEDURE — 76770 US EXAM ABDO BACK WALL COMP: CPT

## 2023-12-29 PROCEDURE — 85025 COMPLETE CBC W/AUTO DIFF WBC: CPT | Performed by: EMERGENCY MEDICINE

## 2023-12-29 PROCEDURE — 71046 X-RAY EXAM CHEST 2 VIEWS: CPT

## 2023-12-29 PROCEDURE — 84484 ASSAY OF TROPONIN QUANT: CPT | Performed by: EMERGENCY MEDICINE

## 2023-12-29 PROCEDURE — 84300 ASSAY OF URINE SODIUM: CPT | Performed by: EMERGENCY MEDICINE

## 2023-12-29 PROCEDURE — 83690 ASSAY OF LIPASE: CPT | Performed by: EMERGENCY MEDICINE

## 2023-12-29 PROCEDURE — 87637 SARSCOV2&INF A&B&RSV AMP PRB: CPT | Performed by: EMERGENCY MEDICINE

## 2023-12-29 PROCEDURE — 83735 ASSAY OF MAGNESIUM: CPT | Performed by: EMERGENCY MEDICINE

## 2023-12-29 PROCEDURE — 96361 HYDRATE IV INFUSION ADD-ON: CPT

## 2023-12-29 PROCEDURE — 250N000011 HC RX IP 250 OP 636: Performed by: HOSPITALIST

## 2023-12-29 PROCEDURE — 120N000001 HC R&B MED SURG/OB

## 2023-12-29 PROCEDURE — 99223 1ST HOSP IP/OBS HIGH 75: CPT | Performed by: HOSPITALIST

## 2023-12-29 PROCEDURE — 84145 PROCALCITONIN (PCT): CPT | Performed by: EMERGENCY MEDICINE

## 2023-12-29 PROCEDURE — 87040 BLOOD CULTURE FOR BACTERIA: CPT | Performed by: EMERGENCY MEDICINE

## 2023-12-29 PROCEDURE — 84100 ASSAY OF PHOSPHORUS: CPT | Performed by: EMERGENCY MEDICINE

## 2023-12-29 RX ORDER — METOPROLOL SUCCINATE 100 MG/1
100 TABLET, EXTENDED RELEASE ORAL DAILY
Status: DISCONTINUED | OUTPATIENT
Start: 2023-12-29 | End: 2023-12-30

## 2023-12-29 RX ORDER — POTASSIUM CHLORIDE 7.45 MG/ML
10 INJECTION INTRAVENOUS ONCE
Status: COMPLETED | OUTPATIENT
Start: 2023-12-29 | End: 2023-12-29

## 2023-12-29 RX ORDER — NICOTINE 21 MG/24HR
1 PATCH, TRANSDERMAL 24 HOURS TRANSDERMAL DAILY
Status: DISCONTINUED | OUTPATIENT
Start: 2023-12-29 | End: 2023-12-31 | Stop reason: HOSPADM

## 2023-12-29 RX ORDER — HEPARIN SODIUM 5000 [USP'U]/.5ML
5000 INJECTION, SOLUTION INTRAVENOUS; SUBCUTANEOUS EVERY 8 HOURS
Status: DISCONTINUED | OUTPATIENT
Start: 2023-12-29 | End: 2023-12-31 | Stop reason: HOSPADM

## 2023-12-29 RX ORDER — SODIUM CHLORIDE AND POTASSIUM CHLORIDE 150; 900 MG/100ML; MG/100ML
INJECTION, SOLUTION INTRAVENOUS CONTINUOUS
Status: DISCONTINUED | OUTPATIENT
Start: 2023-12-29 | End: 2023-12-31 | Stop reason: HOSPADM

## 2023-12-29 RX ORDER — PIPERACILLIN SODIUM, TAZOBACTAM SODIUM 3; .375 G/15ML; G/15ML
3.38 INJECTION, POWDER, LYOPHILIZED, FOR SOLUTION INTRAVENOUS ONCE
Status: COMPLETED | OUTPATIENT
Start: 2023-12-29 | End: 2023-12-29

## 2023-12-29 RX ORDER — POTASSIUM CHLORIDE 1500 MG/1
40 TABLET, EXTENDED RELEASE ORAL ONCE
Status: DISCONTINUED | OUTPATIENT
Start: 2023-12-29 | End: 2023-12-29

## 2023-12-29 RX ORDER — CLONAZEPAM 1 MG/1
1 TABLET ORAL 2 TIMES DAILY PRN
Status: DISCONTINUED | OUTPATIENT
Start: 2023-12-29 | End: 2023-12-31 | Stop reason: HOSPADM

## 2023-12-29 RX ORDER — LIDOCAINE 40 MG/G
CREAM TOPICAL
Status: DISCONTINUED | OUTPATIENT
Start: 2023-12-29 | End: 2023-12-31 | Stop reason: HOSPADM

## 2023-12-29 RX ORDER — HEPARIN SODIUM 5000 [USP'U]/.5ML
5000 INJECTION, SOLUTION INTRAVENOUS; SUBCUTANEOUS EVERY 8 HOURS
Status: DISCONTINUED | OUTPATIENT
Start: 2023-12-29 | End: 2023-12-29

## 2023-12-29 RX ORDER — POTASSIUM CHLORIDE 1.5 G/1.58G
20 POWDER, FOR SOLUTION ORAL 2 TIMES DAILY
Status: DISCONTINUED | OUTPATIENT
Start: 2023-12-29 | End: 2023-12-31 | Stop reason: HOSPADM

## 2023-12-29 RX ORDER — FOLIC ACID 1 MG/1
1 TABLET ORAL DAILY
Status: DISCONTINUED | OUTPATIENT
Start: 2023-12-30 | End: 2023-12-31 | Stop reason: HOSPADM

## 2023-12-29 RX ORDER — POTASSIUM CHLORIDE 1500 MG/1
20 TABLET, EXTENDED RELEASE ORAL 2 TIMES DAILY
Status: CANCELLED | OUTPATIENT
Start: 2023-12-29

## 2023-12-29 RX ORDER — ASPIRIN 81 MG/1
81 TABLET ORAL EVERY EVENING
COMMUNITY

## 2023-12-29 RX ORDER — AMLODIPINE BESYLATE 5 MG/1
5 TABLET ORAL DAILY
Status: DISCONTINUED | OUTPATIENT
Start: 2023-12-29 | End: 2023-12-30

## 2023-12-29 RX ORDER — CALCIUM CARBONATE 500 MG/1
1000 TABLET, CHEWABLE ORAL 4 TIMES DAILY PRN
Status: DISCONTINUED | OUTPATIENT
Start: 2023-12-29 | End: 2023-12-31 | Stop reason: HOSPADM

## 2023-12-29 RX ORDER — AMOXICILLIN 250 MG
2 CAPSULE ORAL 2 TIMES DAILY PRN
Status: DISCONTINUED | OUTPATIENT
Start: 2023-12-29 | End: 2023-12-31 | Stop reason: HOSPADM

## 2023-12-29 RX ORDER — AMOXICILLIN 250 MG
1 CAPSULE ORAL 2 TIMES DAILY PRN
Status: DISCONTINUED | OUTPATIENT
Start: 2023-12-29 | End: 2023-12-31 | Stop reason: HOSPADM

## 2023-12-29 RX ORDER — ASPIRIN 81 MG/1
81 TABLET ORAL EVERY EVENING
Status: DISCONTINUED | OUTPATIENT
Start: 2023-12-29 | End: 2023-12-31 | Stop reason: HOSPADM

## 2023-12-29 RX ORDER — AMLODIPINE BESYLATE 5 MG/1
5 TABLET ORAL DAILY
Status: ON HOLD | COMMUNITY
End: 2023-12-31

## 2023-12-29 RX ORDER — PANTOPRAZOLE SODIUM 40 MG/1
40 TABLET, DELAYED RELEASE ORAL DAILY
Status: DISCONTINUED | OUTPATIENT
Start: 2023-12-29 | End: 2023-12-31 | Stop reason: HOSPADM

## 2023-12-29 RX ORDER — FOLIC ACID 1 MG/1
1 TABLET ORAL DAILY
COMMUNITY

## 2023-12-29 RX ORDER — POTASSIUM CHLORIDE 1500 MG/1
20 TABLET, EXTENDED RELEASE ORAL 2 TIMES DAILY
COMMUNITY

## 2023-12-29 RX ADMIN — ASPIRIN 81 MG: 81 TABLET, COATED ORAL at 20:46

## 2023-12-29 RX ADMIN — PANTOPRAZOLE SODIUM 40 MG: 40 TABLET, DELAYED RELEASE ORAL at 17:21

## 2023-12-29 RX ADMIN — PIPERACILLIN AND TAZOBACTAM 3.38 G: 3; .375 INJECTION, POWDER, FOR SOLUTION INTRAVENOUS at 14:27

## 2023-12-29 RX ADMIN — VANCOMYCIN HYDROCHLORIDE 1500 MG: 5 INJECTION, POWDER, LYOPHILIZED, FOR SOLUTION INTRAVENOUS at 15:51

## 2023-12-29 RX ADMIN — NICOTINE 1 PATCH: 14 PATCH, EXTENDED RELEASE TRANSDERMAL at 16:57

## 2023-12-29 RX ADMIN — SODIUM CHLORIDE, POTASSIUM CHLORIDE, SODIUM LACTATE AND CALCIUM CHLORIDE 1000 ML: 600; 310; 30; 20 INJECTION, SOLUTION INTRAVENOUS at 14:26

## 2023-12-29 RX ADMIN — HEPARIN SODIUM 5000 UNITS: 5000 INJECTION, SOLUTION INTRAVENOUS; SUBCUTANEOUS at 17:23

## 2023-12-29 RX ADMIN — POTASSIUM CHLORIDE FOR ORAL SOLUTION 20 MEQ: 1.5 POWDER, FOR SOLUTION ORAL at 20:46

## 2023-12-29 RX ADMIN — SODIUM CHLORIDE 1000 ML: 9 INJECTION, SOLUTION INTRAVENOUS at 12:16

## 2023-12-29 RX ADMIN — POTASSIUM CHLORIDE 10 MEQ: 7.46 INJECTION, SOLUTION INTRAVENOUS at 15:56

## 2023-12-29 RX ADMIN — POTASSIUM CHLORIDE AND SODIUM CHLORIDE: 900; 150 INJECTION, SOLUTION INTRAVENOUS at 17:15

## 2023-12-29 ASSESSMENT — ACTIVITIES OF DAILY LIVING (ADL)
WEAR_GLASSES_OR_BLIND: YES
DRESSING/BATHING_DIFFICULTY: NO
DOING_ERRANDS_INDEPENDENTLY_DIFFICULTY: NO
ADLS_ACUITY_SCORE: 20
ADLS_ACUITY_SCORE: 35
DIFFICULTY_COMMUNICATING: NO
CONCENTRATING,_REMEMBERING_OR_MAKING_DECISIONS_DIFFICULTY: NO
TOILETING_ISSUES: NO
WALKING_OR_CLIMBING_STAIRS_DIFFICULTY: NO
ADLS_ACUITY_SCORE: 24
ADLS_ACUITY_SCORE: 35
FALL_HISTORY_WITHIN_LAST_SIX_MONTHS: YES
NUMBER_OF_TIMES_PATIENT_HAS_FALLEN_WITHIN_LAST_SIX_MONTHS: 3
HEARING_DIFFICULTY_OR_DEAF: NO
ADLS_ACUITY_SCORE: 35
DIFFICULTY_EATING/SWALLOWING: NO
CHANGE_IN_FUNCTIONAL_STATUS_SINCE_ONSET_OF_CURRENT_ILLNESS/INJURY: YES
ADLS_ACUITY_SCORE: 35

## 2023-12-29 NOTE — ED NOTES
"[facility]  ED Nurse Handoff Report    ED Chief complaint: Dizziness  . ED Diagnosis:   Final diagnoses:   Acute kidney injury superimposed on CKD    Sepsis, due to unspecified organism, unspecified whether acute organ dysfunction present (H)       Allergies:   Allergies   Allergen Reactions    Sunflower Oil Difficulty breathing    Lisinopril Cough       Code Status: Full Code    Activity level - Baseline/Home:  independent.  Activity Level - Current:   standby.   Lift room needed: No.   Bariatric: No   Needed: No   Isolation: No.   Infection: Not Applicable.     Respiratory status: Room air    Vital Signs (within 30 minutes):   Vitals:    12/29/23 1413 12/29/23 1437 12/29/23 1438 12/29/23 1443   BP: 117/62      Pulse:    61   Resp:    16   Temp:       TempSrc:       SpO2:    98%   Weight:   65.2 kg (143 lb 11.8 oz)    Height:  1.651 m (5' 5\")         Cardiac Rhythm:  ,      Pain level:    Patient confused: No.   Patient Falls Risk: nonskid shoes/slippers when out of bed and patient and family education.   Elimination Status: Has voided     Patient Report - Initial Complaint: dizziness, hypotension  Focused Assessment: Isa Eller is a 63 year old female with history of hypertension, prediabetes, CKD, and PAD who presents with room spinning dizziness. The patient notes running a low grade fever the past three days with subsequent loss of appetite. The patient has had multiple falls, including one today. She denies hitting her head. She has not yet taken her blood pressure medications today. She notes having a hernia surgery in March of 2022 with several complications. She normally follows up with her PCP, but her PCP is on a 6 month leave of absence. The patient denies chest pain, chest pressure, new abdominal pain, urinary symptoms, or one sided weakness or numbness, new swelling, or recent travel.     Abnormal Results:   Labs Ordered and Resulted from Time of ED Arrival to Time " of ED Departure   LACTIC ACID WHOLE BLOOD - Abnormal       Result Value    Lactic Acid 3.2 (*)    COMPREHENSIVE METABOLIC PANEL - Abnormal    Sodium 130 (*)     Potassium 2.9 (*)     Carbon Dioxide (CO2) 21 (*)     Anion Gap 18 (*)     Urea Nitrogen 24.1 (*)     Creatinine 3.80 (*)     GFR Estimate 13 (*)     Calcium 8.9      Chloride 91 (*)     Glucose 129 (*)     Alkaline Phosphatase 231 (*)     AST 99 (*)      (*)     Protein Total 6.7      Albumin 3.5      Bilirubin Total 0.7     TROPONIN T, HIGH SENSITIVITY - Abnormal    Troponin T, High Sensitivity 15 (*)    ROUTINE UA WITH MICROSCOPIC - Abnormal    Color Urine Straw      Appearance Urine Clear      Glucose Urine Negative      Bilirubin Urine Negative      Ketones Urine Negative      Specific Gravity Urine 1.004      Blood Urine Small (*)     pH Urine 6.5      Protein Albumin Urine 30 (*)     Urobilinogen Urine Normal      Nitrite Urine Negative      Leukocyte Esterase Urine Trace (*)     Bacteria Urine Few (*)     RBC Urine 1      WBC Urine 2      Squamous Epithelials Urine 2 (*)    PROCALCITONIN - Abnormal    Procalcitonin 1.16 (*)    LIPASE - Abnormal    Lipase 110 (*)    ACETAMINOPHEN LEVEL - Abnormal    Acetaminophen <5.0 (*)    INFLUENZA A/B, RSV, & SARS-COV2 PCR - Abnormal    Influenza A PCR Negative      Influenza B PCR Negative      RSV PCR Negative      SARS CoV2 PCR Positive (*)    NT PROBNP INPATIENT - Normal    N terminal Pro BNP Inpatient 726     MAGNESIUM - Normal    Magnesium 1.7     PHOSPHORUS - Normal    Phosphorus 3.3     LACTIC ACID WHOLE BLOOD - Normal    Lactic Acid 0.8     LACTIC ACID WHOLE BLOOD - Normal    Lactic Acid 1.0     CBC WITH PLATELETS AND DIFFERENTIAL    WBC Count 7.1      RBC Count 4.15      Hemoglobin 12.9      Hematocrit 38.3      MCV 92      MCH 31.1      MCHC 33.7      RDW 13.7      Platelet Count 153      % Neutrophils 74      % Lymphocytes 11      % Monocytes 13      % Eosinophils 0      % Basophils 1      %  Immature Granulocytes 1      NRBCs per 100 WBC 0      Absolute Neutrophils 5.3      Absolute Lymphocytes 0.8      Absolute Monocytes 0.9      Absolute Eosinophils 0.0      Absolute Basophils 0.0      Absolute Immature Granulocytes 0.0      Absolute NRBCs 0.0     SODIUM RANDOM URINE    Sodium Urine mmol/L 32     ACUTE HEPATITIS PANEL   BLOOD CULTURE   BLOOD CULTURE        Chest XR,  PA & LAT   Final Result      CT Abdomen Pelvis w/o Contrast   Final Result   IMPRESSION:    1.  No acute abnormality in the abdomen or pelvis.      RAHUL QUIJANO MD            SYSTEM ID:  [id number]      US Renal Complete w Arterial Duplex    (Results Pending)       Treatments provided: IVF, abx, IV potassium  Family Comments: Friend at bedside  OBS brochure/video discussed/provided to patient:  Yes  ED Medications:   Medications   0.9% sodium chloride + KCl 20 mEq/L infusion ( Intravenous $New Bag 12/29/23 1715)   amLODIPine (NORVASC) tablet 5 mg ( Oral Automatically Held 1/1/24 0800)   aspirin EC tablet 81 mg (has no administration in time range)   clonazePAM (klonoPIN) tablet 1 mg (has no administration in time range)   folic acid (FOLVITE) tablet 1 mg (has no administration in time range)   metoprolol succinate ER (TOPROL XL) 24 hr tablet 100 mg ( Oral Automatically Held 1/1/24 0800)   pantoprazole (PROTONIX) EC tablet 40 mg (40 mg Oral $Given 12/29/23 1721)   lidocaine 1 % 0.1-1 mL (has no administration in time range)   lidocaine (LMX4) cream (has no administration in time range)   sodium chloride (PF) 0.9% PF flush 3 mL (3 mLs Intracatheter $Given 12/29/23 1720)   sodium chloride (PF) 0.9% PF flush 3 mL (has no administration in time range)   senna-docusate (SENOKOT-S/PERICOLACE) 8.6-50 MG per tablet 1 tablet (has no administration in time range)     Or   senna-docusate (SENOKOT-S/PERICOLACE) 8.6-50 MG per tablet 2 tablet (has no administration in time range)   calcium carbonate (TUMS) chewable tablet 1,000 mg (has no  administration in time range)   heparin ANTICOAGULANT injection 5,000 Units (5,000 Units Subcutaneous $Given 12/29/23 1723)   nicotine Patch in Place (1 each Transdermal Patch in Place 12/29/23 1701)   nicotine (NICODERM CQ) 14 MG/24HR 24 hr patch 1 patch (1 patch Transdermal $Patch/Med Applied 12/29/23 1657)   potassium chloride (KLOR-CON) Packet 20 mEq (has no administration in time range)   sodium chloride 0.9% BOLUS 1,000 mL (0 mLs Intravenous Stopped 12/29/23 1400)   lactated ringers BOLUS 1,000 mL (1,000 mLs Intravenous $New Bag 12/29/23 1426)   piperacillin-tazobactam (ZOSYN) 3.375 g vial to attach to  mL bag (3.375 g Intravenous $New Bag 12/29/23 1427)   vancomycin (VANCOCIN) 1,500 mg in 0.9% NaCl 250 mL intermittent infusion (1,500 mg Intravenous $New Bag 12/29/23 1551)   potassium chloride 10 mEq in 100 mL sterile water infusion (10 mEq Intravenous $New Bag 12/29/23 1556)       Drips infusing:  IVF with K+  For the majority of the shift this patient was Green.   Interventions performed were NA.    Sepsis treatment initiated: BC drawn, abx started after    Cares/treatment/interventions/medications to be completed following ED care: NA\    ED Nurse Name: Zenia Guy RN  5:48 PM    RECEIVING UNIT ED HANDOFF REVIEW    Above ED Nurse Handoff Report was reviewed: Yes  Reviewed by: Marcos Howell RN on December 29, 2023 at 7:02 PM

## 2023-12-29 NOTE — ED PROVIDER NOTES
History     Chief Complaint:  Dizziness       HPI   Isa Eller is a 63 year old female with history of hypertension, prediabetes, CKD, and PAD who presents with room spinning dizziness. The patient notes running a low grade fever the past three days with subsequent loss of appetite. The patient has had multiple falls, including one today. She denies hitting her head. She has not yet taken her blood pressure medications today. She notes having a hernia surgery in March of 2022 with several complications. She normally follows up with her PCP, but her PCP is on a 6 month leave of absence. The patient denies chest pain, chest pressure, new abdominal pain, urinary symptoms, or one sided weakness or numbness, new swelling, or recent travel.    Independent Historian:    None     Review of External Notes:  None      Medications:    Zoloft   Norvasc   Toprol   Prilosec  Klonopin   Vitamin D  Crestor  Cozaar  Senna    Past Medical History:    Carpal tunnel syndrome  Colon polyp  Hypertension  Hyperlipidemia  Prediabetes  Stage 3 CKD  Vitamin D deficiency   Anxiety   PAD  Thrombosis     Past Surgical History:    Breast surgery  Bypass graft aortoiliac  Ventral herniorrhaphy repair  Cholecystectomy with cholangiograms   Vascular surgery     Physical Exam   Patient Vitals for the past 24 hrs:   BP Temp Temp src Pulse Resp SpO2   12/29/23 1221 -- 97.6  F (36.4  C) Oral -- -- --   12/29/23 1212 (!) 88/53 -- -- 72 -- 100 %   12/29/23 1207 (!) 69/49 -- -- -- -- --   12/29/23 1204 (!) 72/50 -- Temporal 78 22 100 %      Physical Exam  Constitutional: Alert, attentive, GCS 15   HENT:    Nose: Nose normal.    Mouth/Throat: Oropharynx is dry  Eyes: EOM are normal, anicteric, conjugate gaze  CV: regular rate and rhythm; no murmurs  Chest: Effort normal and breath sounds clear without wheezing or rales, symmetric bilaterally   GI:  non tender including no right upper quadrant tenderness. No distension. No guarding or rebound.     MSK: No LE edema, no tenderness to palpation of BLE.  Neurological: Alert, attentive, moving all extremities equally.   Skin: Skin is warm and dry.      Emergency Department Course   ECG  ECG taken at 1219, ECG read at 1225  Probable sinus rhythm  ST & T wave abnormality, consider anterolateral ischemia, BP 85/50, consider BP related  Abnormal ECG    When compared with prior ECG, rate decreased by 50 BPM  Rate 64 bpm. DE interval * ms. QRS duration 90 ms. QT/QTc 448/462 ms. P-R-T axes 69 58 78.    Imaging:  Chest XR,  PA & LAT   Final Result      CT Abdomen Pelvis w/o Contrast   Final Result   IMPRESSION:    1.  No acute abnormality in the abdomen or pelvis.      RAHUL QUIJANO MD            SYSTEM ID:  [id number]        Report per radiology    Laboratory:  Labs Ordered and Resulted from Time of ED Arrival to Time of ED Departure   LACTIC ACID WHOLE BLOOD - Abnormal       Result Value    Lactic Acid 3.2 (*)    COMPREHENSIVE METABOLIC PANEL - Abnormal    Sodium 130 (*)     Potassium 2.9 (*)     Carbon Dioxide (CO2) 21 (*)     Anion Gap 18 (*)     Urea Nitrogen 24.1 (*)     Creatinine 3.80 (*)     GFR Estimate 13 (*)     Calcium 8.9      Chloride 91 (*)     Glucose 129 (*)     Alkaline Phosphatase 231 (*)     AST 99 (*)      (*)     Protein Total 6.7      Albumin 3.5      Bilirubin Total 0.7     TROPONIN T, HIGH SENSITIVITY - Abnormal    Troponin T, High Sensitivity 15 (*)    ROUTINE UA WITH MICROSCOPIC - Abnormal    Color Urine Straw      Appearance Urine Clear      Glucose Urine Negative      Bilirubin Urine Negative      Ketones Urine Negative      Specific Gravity Urine 1.004      Blood Urine Small (*)     pH Urine 6.5      Protein Albumin Urine 30 (*)     Urobilinogen Urine Normal      Nitrite Urine Negative      Leukocyte Esterase Urine Trace (*)     Bacteria Urine Few (*)     RBC Urine 1      WBC Urine 2      Squamous Epithelials Urine 2 (*)    PROCALCITONIN - Abnormal    Procalcitonin 1.16 (*)     LIPASE - Abnormal    Lipase 110 (*)    ACETAMINOPHEN LEVEL - Abnormal    Acetaminophen <5.0 (*)    NT PROBNP INPATIENT - Normal    N terminal Pro BNP Inpatient 726     MAGNESIUM - Normal    Magnesium 1.7     PHOSPHORUS - Normal    Phosphorus 3.3     LACTIC ACID WHOLE BLOOD - Normal    Lactic Acid 0.8     CBC WITH PLATELETS AND DIFFERENTIAL    WBC Count 7.1      RBC Count 4.15      Hemoglobin 12.9      Hematocrit 38.3      MCV 92      MCH 31.1      MCHC 33.7      RDW 13.7      Platelet Count 153      % Neutrophils 74      % Lymphocytes 11      % Monocytes 13      % Eosinophils 0      % Basophils 1      % Immature Granulocytes 1      NRBCs per 100 WBC 0      Absolute Neutrophils 5.3      Absolute Lymphocytes 0.8      Absolute Monocytes 0.9      Absolute Eosinophils 0.0      Absolute Basophils 0.0      Absolute Immature Granulocytes 0.0      Absolute NRBCs 0.0     SODIUM RANDOM URINE    Sodium Urine mmol/L 32     INFLUENZA A/B, RSV, & SARS-COV2 PCR   LACTIC ACID WHOLE BLOOD   BLOOD CULTURE   BLOOD CULTURE      Emergency Department Course & Assessments:       Interventions:  Medications   vancomycin (VANCOCIN) 1,500 mg in 0.9% NaCl 250 mL intermittent infusion (has no administration in time range)   sodium chloride 0.9% BOLUS 1,000 mL (0 mLs Intravenous Stopped 12/29/23 1400)   lactated ringers BOLUS 1,000 mL (1,000 mLs Intravenous $New Bag 12/29/23 1426)   piperacillin-tazobactam (ZOSYN) 3.375 g vial to attach to  mL bag (3.375 g Intravenous $New Bag 12/29/23 1427)      Assessments:  1214 I obtained history and examined the patient as noted above.   1400   I rechecked the patient, she is now normotensive, feeling like she needs to void spontaneously I reviewed with her the need to keep her, her labs concerning for potential infection.    Independent Interpretation (X-rays, CTs, rhythm strip):  I personally reviewed chest x-ray, see no notes of pneumothorax or pneumonia    Consultations/Discussion of Management  or Tests:  Hospital service       Social Determinants of Health affecting care:  None      Disposition:  The patient was admitted to the hospital under the care of Dr. Moss with [facility].     Impression & Plan    Medical Decision Makin-year-old past medical history significant for CKD, prior SBO, recurrent abdominal pain after hernia surgery presenting for evaluation of reported low-grade temps and general malaise and lightheadedness found to be hypotensive but not tachycardic.  Sepsis evaluation was undertaken, initial lactate was noted to be 3.2 however she is afebrile here.  Her blood pressure quickly resolved with IV fluids, and recheck lactate was 0.8, no indication for pressors.  Her procalcitonin did return elevated and as such she was covered broadly with antibiotics as a precaution given her hypotension and elevated lactate.  UA is negative.  Blood cultures pending.  COVID, influenza and RSV are pending.  Creatinine is significantly evaded at 3.8 up from a baseline closer to 1.6, I suspect this is predominantly prerenal given history of poor p.o. intake.  CT imaging of her abdomen shows no obvious extrinsic compression, UA is bland.  She does have some mildly elevated LFTs however no right upper quadrant tenderness and no clear evidence of acute cholecystitis on CT given absence of pain, will hold off on ultrasound, Tylenol level is negative.  She denies ingestion.  Question if this is secondary to viral etiology versus hypotension.  We will plan for medicine admission for continued treatment of potential sepsis without shock and ROCIO on CKD in the setting of general malaise and poor p.o. intake.    Diagnosis:    ICD-10-CM    1. Acute kidney injury superimposed on CKD   N17.9     N18.9       2. Sepsis, due to unspecified organism, unspecified whether acute organ dysfunction present (H)  A41.9            Yordan Martinez MD  [employer]  3:32 PM 23      Scribe Disclosure:  I, Matt Vargas, am serving as a scribe at 12:11 PM on 12/29/2023 to document services personally performed by Yordan Martinez MD based on my observations and the provider's statements to me.                Yordan Martinez MD  12/29/23 1531

## 2023-12-29 NOTE — PHARMACY-ADMISSION MEDICATION HISTORY
Pharmacist Admission Medication History    Admission medication history is complete. The information provided in this note is only as accurate as the sources available at the time of the update.    Information Source(s): Patient, CareEverywhere/SureScripts, and Pt's own med list  via in-person    Pertinent Information:      Changes made to PTA medication list:  Added: Potassium 20meq & Folic acid  Deleted: Tylenol, Ibuprofen, Losartan, Oxycodone, Vit.D & Senna-S  Changed: Amlodpine 2.5mg daily-->5mg daily    Medication Affordability:  Not including over the counter (OTC) medications, was there a time in the past 3 months when you did not take your medications as prescribed because of cost?: No    Allergies reviewed with patient and updates made in EHR: yes    Medication History Completed By: Wanda Crane PharmD 12/29/2023 2:31 PM    PTA Med List   Medication Sig Last Dose    amLODIPine (NORVASC) 5 MG tablet Take 5 mg by mouth daily 12/28/2023 at pm    aspirin 81 MG EC tablet Take 81 mg by mouth every evening 12/28/2023 at pm    clonazePAM (KLONOPIN) 1 MG tablet Take 1 mg by mouth 2 times daily as needed for anxiety  prn    folic acid (FOLVITE) 1 MG tablet Take 1 mg by mouth daily 12/28/2023    Metoprolol Succinate (TOPROL XL PO) Take 100 mg by mouth daily 12/28/2023    omeprazole (PRILOSEC) 40 MG capsule Take 1 capsule (40 mg) by mouth daily Take 30-60 minutes before a meal. 12/28/2023    potassium chloride ER (K-TAB) 20 MEQ CR tablet Take 20 mEq by mouth 2 times daily 12/28/2023 at pm    rosuvastatin (CRESTOR) 40 MG tablet Take 40 mg by mouth every evening 12/28/2023 at pm

## 2023-12-29 NOTE — H&P
"[facility]    History and Physical - Hospitalist Service       Date of Admission:  12/29/2023    Assessment & Plan      Isa Eller is a 63 year old female with history of PAD, bilateral iliac stenting then bilateral aortoiliac bypass grafting (2018), HTN, HLP, DVT, SBO related to ventral hernia, anxiety, CKD admitted on 12/29/2023 with symptoms of lightheadedness, fever, weakness.  Found to have acute on chronic renal failure, hypokalemia, transaminitis, hypotension.  Recent Covid in Nov    Fevers    - patient states she had fevers up to 101 the past 3 days    - afebrile in the ED    - no elevation of WBC    - CXR, UA, CT abdo/pelvis unrevealing    - blood cultures pending    - no localizing infectious symptoms    - procal was elevated as was lactic acid but these can be elevated in ROCIO (lactic now normal)    - was given vanco and zosyn in the ED, but there is no clear infection    - will not continue antibiotics    Hypotension    - was hypotensive in the ED (lowest 69/49)    - improved with IVF    - likely due to decreased PO intake in the setting of still taking BP meds    - cont IVF overnight (add KCL to fluids)    Acute on chronic kidney failure    - has stage 3b kidney disease    - follows with ZEUS Aranda, CNP with [facility]    - baseline Cr around 1.5-2    - UA looks fine    - will obtain renal US with arterial duplex as she is a vasculopath    - denies use of nephrotoxins    - may be in part due to decreased PO intake, though she states she was drinking \"a lot\" of water (at least 4-5 12oz glasses)    Transaminitis    - CT imaging does not show abnormalities    - no pain or tenderness on exam    - no herbal remedy use    - Tylenol level neg in the ED    - will send viral hepatitis panel    - possibly due to injury due to hypotension    Hypokalemia    - add KCL to IVF    - takes KCL 20meq BID at home    - will replace and re-order home doses (she prefers not to have the " "tablets)    - if still low in am, can order KCL replacement protocol    Decreased PO intake    - has been for 2 years    - relates to time after her ventral hernia repair surgery    - no pain or nausea or difficulty swallowing    - states food \"tastes bad\"    - vapes nicotine x years    - likely related to vaping    - she should stop, but will need nicotine replacement    - regular diet, added ensure    HTN    - hold home metoprolol and amlodipine due to hypotension    HLP    - ok to hold statin while admitted    PAD    - had bilateral iliac stenting    - then bilateral aortoiliac bypass grafting (2018)    - cont asa    History of SBO related to ventral hernia    - stable    History of DVT    - will be on prophylaxis    Recent Covid infection    - was sick in Nov    - PCR still +    - no precautions needed    Vape/nicotine use    - ordered patch    Full code    Friend in room updated        Diet:  regular  DVT Prophylaxis: Heparin SQ  Temple Catheter: Not present  Lines: None     Cardiac Monitoring: None  Code Status:  Full code    Clinically Significant Risk Factors Present on Admission        # Hypokalemia: Lowest K = 2.9 mmol/L in last 2 days, will replace as needed         # Drug Induced Platelet Defect: home medication list includes an antiplatelet medication                   Disposition Plan      Expected Discharge Date: 12/31/2023                  Juan Ramon Stearns MD  Hospitalist Service  [facility]  Securely message with [employer] (more info)  Text page via [employer] Paging/Directory     ______________________________________________________________________    Chief Complaint   Lightheadedness, fevers, weakness, falls    History is obtained from the patient    History of Present Illness   Isa Eller is a 63 year old female with history of PAD, bilateral iliac stenting then bilateral aortoiliac bypass grafting (2018), HTN, HLP, DVT, SBO related to ventral hernia, anxiety, CKD admitted on " 12/29/2023 with symptoms of lightheadedness, fever, weakness.  Found to have acute on chronic renal failure, hypokalemia, transaminitis, hypotension.  Patient states that for the past 2 years she has had an aversion to foods.  She relates it to a ventral hernia repair surgery that she had 2 years ago.  She states all food taste and smell bad.  She states over the past 3 days she has had some fevers.  They have been intermittent.  They have ranged from 99 to 101.  She states that today she had lightheadedness.  She has been having some falls because of weakness.  No trauma.  She denies any chest pain, shortness of breath.  No abdominal pain, nausea, vomiting, diarrhea.  She states her last bowel movement was last week, because she does not eat.  She denies any cough, runny nose, sore throat.  No urinary symptoms.  No recent rashes or open wounds.  She denies any new medications, over-the-counter medications or herbal medications.  She states a couple weeks ago she tried to stop vaping her nicotine because she felt like this what was causing her aversion to food.  She started to go through withdrawal from nicotine and resumed vaping.  She has not had any alcohol in the past few days.  She does not use NSAIDs.  She tells me she has not had any food in the past 3 days, but she has been drinking.  She states she drinks 4 to 512 ounce servings of water per day.      Past Medical History    Past Medical History:   Diagnosis Date    Anxiety     Clotting disorder (H)     Hyperlipemia     Hypertension     PAD (peripheral artery disease) (H)     Thrombosis        Past Surgical History   Past Surgical History:   Procedure Laterality Date    BREAST SURGERY      BYPASS GRAFT AORTOILIAC  2018    DAVINCI XI HERNIORRHAPHY VENTRAL N/A 3/9/2022    Procedure: ROBOT-ASSISTED REPAIR OF MULTIPLE MIDLINE VENTRAL HERNIAS, USING DA THERESA XI;  Surgeon: Lacey Munroe MD;  Location: [facility] OR    LAPAROSCOPIC CHOLECYSTECTOMY WITH  CHOLANGIOGRAMS N/A 10/19/2014    Procedure: LAPAROSCOPIC CHOLECYSTECTOMY WITH CHOLANGIOGRAMS;  Surgeon: Seamus Holman MD;  Location: [facility] OR    VASCULAR SURGERY         Prior to Admission Medications   Prior to Admission Medications   Prescriptions Last Dose Informant Patient Reported? Taking?   Metoprolol Succinate (TOPROL XL PO) 12/28/2023 Pharmacy Yes Yes   Sig: Take 100 mg by mouth daily   amLODIPine (NORVASC) 5 MG tablet 12/28/2023 at pm  Yes Yes   Sig: Take 5 mg by mouth daily   aspirin 81 MG EC tablet 12/28/2023 at pm  Yes Yes   Sig: Take 81 mg by mouth every evening   clonazePAM (KLONOPIN) 1 MG tablet prn  Yes Yes   Sig: Take 1 mg by mouth 2 times daily as needed for anxiety    folic acid (FOLVITE) 1 MG tablet 12/28/2023  Yes Yes   Sig: Take 1 mg by mouth daily   omeprazole (PRILOSEC) 40 MG capsule 12/28/2023  No Yes   Sig: Take 1 capsule (40 mg) by mouth daily Take 30-60 minutes before a meal.   potassium chloride ER (K-TAB) 20 MEQ CR tablet 12/28/2023 at pm  Yes Yes   Sig: Take 20 mEq by mouth 2 times daily   rosuvastatin (CRESTOR) 40 MG tablet 12/28/2023 at pm  Yes Yes   Sig: Take 40 mg by mouth every evening      Facility-Administered Medications: None        Review of Systems    The 10 point Review of Systems is negative other than noted in the HPI or here.     Social History   I have reviewed this patient's social history and updated it with pertinent information if needed.  Social History     Tobacco Use    Smoking status: Former    Smokeless tobacco: Never    Tobacco comments:     Vapes   Substance Use Topics    Alcohol use: Yes    Drug use: Not Currently     Types: Marijuana     Does not drink daily. No drinks the past few days    Family History   I have reviewed this patient's family history and updated it with pertinent information if needed.  Family History   Problem Relation Age of Onset    Hypertension Mother     Heart Disease Father     Hypertension Father          Allergies   Allergies    Allergen Reactions    Sunflower Oil Difficulty breathing    Lisinopril Cough        Physical Exam   Vital Signs: Temp: 97.6  F (36.4  C) Temp src: Oral BP: 117/62 Pulse: 61   Resp: 16 SpO2: 98 % O2 Device: None (Room air)    Weight: 143 lbs 11.84 oz    Constitutional: awake, alert, cooperative, no apparent distress, and appears stated age  Eyes: Lids and lashes normal, pupils equal, round and reactive to light, extra ocular muscles intact, sclera clear, conjunctiva normal  ENT: Normocephalic, without obvious abnormality, atraumatic, sinuses nontender on palpation, external ears without lesions, oral pharynx with moist mucous membranes, tonsils without erythema or exudates, gums normal and good dentition.  Respiratory: No increased work of breathing, good air exchange, clear to auscultation bilaterally, no crackles or wheezing  Cardiovascular: Normal apical impulse, regular rate and rhythm, normal S1 and S2, no S3 or S4, and no murmur noted  GI: No scars, normal bowel sounds, soft, non-distended, non-tender, no masses palpated, no hepatosplenomegally  Skin: no bruising or bleeding, no rashes, and no lesions  Musculoskeletal: no lower extremity pitting edema present    Medical Decision Making       70 MINUTES SPENT BY ME on the date of service doing chart review, history, exam, documentation & further activities per the note.      Data     I have personally reviewed the following data over the past 24 hrs:    7.1  \   12.9   / 153     130 (L) 91 (L) 24.1 (H) /  129 (H)   2.9 (L) 21 (L) 3.80 (H) \     ALT: 111 (H) AST: 99 (H) AP: 231 (H) TBILI: 0.7   ALB: 3.5 TOT PROTEIN: 6.7 LIPASE: 110 (H)     Trop: 15 (H) BNP: 726     Procal: 1.16 (H) CRP: N/A Lactic Acid: 1.0         Imaging results reviewed over the past 24 hrs:   Recent Results (from the past 24 hour(s))   CT Abdomen Pelvis w/o Contrast    Narrative    CT ABDOMEN PELVIS W/O CONTRAST 12/29/2023 1:34 PM    CLINICAL HISTORY: acute renal failure, elevated  LFTs  TECHNIQUE: CT scan of the abdomen and pelvis was performed without IV  contrast. Multiplanar reformats were obtained. Dose reduction  techniques were used.  CONTRAST: None.    COMPARISON: CT abdomen/pelvis 2/14/2022    FINDINGS:   LOWER CHEST: Unremarkable.    HEPATOBILIARY: Cholecystectomy. No evidence of biliary obstruction.    PANCREAS: Normal.    SPLEEN: Normal.    ADRENAL GLANDS: Normal.    KIDNEYS/BLADDER: Unchanged cortical scarring in the left kidney. No  nephroureterolithiasis or hydronephrosis. Urinary bladder is  unremarkable.    BOWEL: Diverticulosis in the colon. No acute inflammatory change. No  obstruction. Normal appendix.    LYMPH NODES: No suspicious lymphadenopathy.    VASCULATURE: Moderate calcified atherosclerosis. Stable postsurgical  changes status post aortobiiliac graft placement. Stents again noted  in the native iliac arteries bilaterally.    PELVIC ORGANS: Multiple calcified uterine leiomyomas, similar to prior  exam.    OTHER: No significant ascites.    MUSCULOSKELETAL: No acute bony abnormality.      Impression    IMPRESSION:   1.  No acute abnormality in the abdomen or pelvis.    RAHUL QUIJANO MD         SYSTEM ID:  [id number]   Chest XR,  PA & LAT    Narrative    XR CHEST 2 VIEWS 12/29/2023 1:41 PM    HISTORY: fever, sepsis    COMPARISON: 7/20/2016    FINDINGS: No consolidation. No pleural effusions or pneumothorax.  Normal cardiac size. Mild aortic atherosclerosis. Surgical clips in  the right upper quadrant.    SILVER PAGE MD         SYSTEM ID:  [id number]

## 2023-12-30 LAB
ALBUMIN SERPL BCG-MCNC: 2.6 G/DL (ref 3.5–5.2)
ALP SERPL-CCNC: 165 U/L (ref 40–150)
ALT SERPL W P-5'-P-CCNC: 66 U/L (ref 0–50)
ANION GAP SERPL CALCULATED.3IONS-SCNC: 12 MMOL/L (ref 7–15)
AST SERPL W P-5'-P-CCNC: 61 U/L (ref 0–45)
BASOPHILS # BLD AUTO: 0 10E3/UL (ref 0–0.2)
BASOPHILS NFR BLD AUTO: 1 %
BILIRUB DIRECT SERPL-MCNC: <0.2 MG/DL (ref 0–0.3)
BILIRUB SERPL-MCNC: 0.4 MG/DL
BUN SERPL-MCNC: 21.2 MG/DL (ref 8–23)
CALCIUM SERPL-MCNC: 7.6 MG/DL (ref 8.8–10.2)
CHLORIDE SERPL-SCNC: 106 MMOL/L (ref 98–107)
CREAT SERPL-MCNC: 2.9 MG/DL (ref 0.51–0.95)
DEPRECATED HCO3 PLAS-SCNC: 19 MMOL/L (ref 22–29)
EGFRCR SERPLBLD CKD-EPI 2021: 17 ML/MIN/1.73M2
EOSINOPHIL # BLD AUTO: 0.1 10E3/UL (ref 0–0.7)
EOSINOPHIL NFR BLD AUTO: 1 %
ERYTHROCYTE [DISTWIDTH] IN BLOOD BY AUTOMATED COUNT: 13.8 % (ref 10–15)
ERYTHROCYTE [DISTWIDTH] IN BLOOD BY AUTOMATED COUNT: 13.8 % (ref 10–15)
GLUCOSE SERPL-MCNC: 79 MG/DL (ref 70–99)
HCT VFR BLD AUTO: 30.3 % (ref 35–47)
HCT VFR BLD AUTO: 30.6 % (ref 35–47)
HGB BLD-MCNC: 10.2 G/DL (ref 11.7–15.7)
HGB BLD-MCNC: 10.3 G/DL (ref 11.7–15.7)
IMM GRANULOCYTES # BLD: 0 10E3/UL
IMM GRANULOCYTES NFR BLD: 1 %
LYMPHOCYTES # BLD AUTO: 0.9 10E3/UL (ref 0.8–5.3)
LYMPHOCYTES NFR BLD AUTO: 17 %
MCH RBC QN AUTO: 31.2 PG (ref 26.5–33)
MCH RBC QN AUTO: 31.4 PG (ref 26.5–33)
MCHC RBC AUTO-ENTMCNC: 33.3 G/DL (ref 31.5–36.5)
MCHC RBC AUTO-ENTMCNC: 34 G/DL (ref 31.5–36.5)
MCV RBC AUTO: 92 FL (ref 78–100)
MCV RBC AUTO: 94 FL (ref 78–100)
MONOCYTES # BLD AUTO: 0.8 10E3/UL (ref 0–1.3)
MONOCYTES NFR BLD AUTO: 16 %
NEUTROPHILS # BLD AUTO: 3.2 10E3/UL (ref 1.6–8.3)
NEUTROPHILS NFR BLD AUTO: 64 %
NRBC # BLD AUTO: 0 10E3/UL
NRBC BLD AUTO-RTO: 0 /100
PLATELET # BLD AUTO: 100 10E3/UL (ref 150–450)
PLATELET # BLD AUTO: 112 10E3/UL (ref 150–450)
POTASSIUM SERPL-SCNC: 3 MMOL/L (ref 3.4–5.3)
PROT SERPL-MCNC: 5.1 G/DL (ref 6.4–8.3)
RBC # BLD AUTO: 3.27 10E6/UL (ref 3.8–5.2)
RBC # BLD AUTO: 3.28 10E6/UL (ref 3.8–5.2)
SODIUM SERPL-SCNC: 137 MMOL/L (ref 135–145)
VIT B12 SERPL-MCNC: 337 PG/ML (ref 232–1245)
WBC # BLD AUTO: 4.3 10E3/UL (ref 4–11)
WBC # BLD AUTO: 5.1 10E3/UL (ref 4–11)

## 2023-12-30 PROCEDURE — 250N000013 HC RX MED GY IP 250 OP 250 PS 637: Performed by: HOSPITALIST

## 2023-12-30 PROCEDURE — 250N000011 HC RX IP 250 OP 636: Performed by: HOSPITALIST

## 2023-12-30 PROCEDURE — 85025 COMPLETE CBC W/AUTO DIFF WBC: CPT | Performed by: HOSPITALIST

## 2023-12-30 PROCEDURE — 36415 COLL VENOUS BLD VENIPUNCTURE: CPT | Performed by: HOSPITALIST

## 2023-12-30 PROCEDURE — 36415 COLL VENOUS BLD VENIPUNCTURE: CPT | Performed by: STUDENT IN AN ORGANIZED HEALTH CARE EDUCATION/TRAINING PROGRAM

## 2023-12-30 PROCEDURE — 99233 SBSQ HOSP IP/OBS HIGH 50: CPT | Performed by: STUDENT IN AN ORGANIZED HEALTH CARE EDUCATION/TRAINING PROGRAM

## 2023-12-30 PROCEDURE — 120N000001 HC R&B MED SURG/OB

## 2023-12-30 PROCEDURE — 82607 VITAMIN B-12: CPT | Performed by: INTERNAL MEDICINE

## 2023-12-30 PROCEDURE — 85027 COMPLETE CBC AUTOMATED: CPT | Performed by: STUDENT IN AN ORGANIZED HEALTH CARE EDUCATION/TRAINING PROGRAM

## 2023-12-30 PROCEDURE — 82040 ASSAY OF SERUM ALBUMIN: CPT | Performed by: HOSPITALIST

## 2023-12-30 RX ADMIN — POTASSIUM CHLORIDE FOR ORAL SOLUTION 20 MEQ: 1.5 POWDER, FOR SOLUTION ORAL at 09:03

## 2023-12-30 RX ADMIN — POTASSIUM CHLORIDE AND SODIUM CHLORIDE: 900; 150 INJECTION, SOLUTION INTRAVENOUS at 03:58

## 2023-12-30 RX ADMIN — FOLIC ACID 1 MG: 1 TABLET ORAL at 09:00

## 2023-12-30 RX ADMIN — PANTOPRAZOLE SODIUM 40 MG: 40 TABLET, DELAYED RELEASE ORAL at 09:00

## 2023-12-30 RX ADMIN — POTASSIUM CHLORIDE AND SODIUM CHLORIDE: 900; 150 INJECTION, SOLUTION INTRAVENOUS at 23:46

## 2023-12-30 RX ADMIN — POTASSIUM CHLORIDE FOR ORAL SOLUTION 20 MEQ: 1.5 POWDER, FOR SOLUTION ORAL at 20:24

## 2023-12-30 RX ADMIN — NICOTINE 1 PATCH: 14 PATCH, EXTENDED RELEASE TRANSDERMAL at 08:57

## 2023-12-30 RX ADMIN — POTASSIUM CHLORIDE AND SODIUM CHLORIDE: 900; 150 INJECTION, SOLUTION INTRAVENOUS at 14:19

## 2023-12-30 RX ADMIN — ASPIRIN 81 MG: 81 TABLET, COATED ORAL at 20:24

## 2023-12-30 ASSESSMENT — ACTIVITIES OF DAILY LIVING (ADL)
ADLS_ACUITY_SCORE: 24
ADLS_ACUITY_SCORE: 22
ADLS_ACUITY_SCORE: 24
ADLS_ACUITY_SCORE: 24
ADLS_ACUITY_SCORE: 22

## 2023-12-30 NOTE — PLAN OF CARE
Assessments:   A/O x4. Afebrile, VSS, on room air. Up SBA. Denies dizziness/pain/n/v/SoB. Continent of bowel and bladder.     Treatment Plan: IVF, monitor labs, symptom management    Bedside Nurse: Marcos Howell RN

## 2023-12-30 NOTE — PLAN OF CARE
"[facility]    ED Boarding Nurse Handoff Addendum Report:    Date/time: 12/29/2023, 7:32 PM    Activity Level: standby    Fall Risk: Yes:  nonskid shoes/slippers when out of bed, arm band in place, assistive device/personal items within reach, activity supervised, and room door open    Active Infusions: NS + K 100 mL/hr    Current Meds Due: NA    Current care needs: NA    Oxygen requirements (liters/min and/or FiO2): nA    Respiratory status: Room air    Vital signs (within last 30 minutes):    Vitals:    12/29/23 1413 12/29/23 1437 12/29/23 1438 12/29/23 1443   BP: 117/62      Pulse:    61   Resp:    16   Temp:       TempSrc:       SpO2:    98%   Weight:   65.2 kg (143 lb 11.8 oz)    Height:  1.651 m (5' 5\")         Focused assessment within last 30 minutes:      ED Boarding Nurse name: Kamla Bocanegra RN   "

## 2023-12-30 NOTE — CONSULTS
[facility] Digestive Health - Gastroenterology Consultation    Consultation Assessment/Plan:    1.) Food Aversion / Dysgeusia:  - food aversion for the past 2 years which began after uneventful abd wall hernia repair  - no common trigger medications on her current medication list  - she has had previous ENT and endocrinology evaluation  - will check vitamin B12 level  - I don't see any other GI related work up or treatment that would be helpful  - other workup may involve neurology?        Candido Gonzalez MD    Office: [phone]    ---------------------------------------------------------------------------------------------------------------------------  Patient Name: Isa Eller      YOB: 1960 (Age: 63 year old)   Medical Record #: [id number]       Primary Physician: Amena Stearns   Referring Provider: Juan Ramon Stearns MD   Admit Date/Time: 12/29/2023 12:06 PM       I was asked to see this patient by Juan Ramon Stearns MD for evaluation of poor appetite.    History of Present Illness:  62 y/o woman with a hx of PAD (aortoiliac bypass), cholecystectomy and hernia repair who presents to the hospital with c/o lightheadedness, weakness and fevers.  During her admission interview she reports having 2 years of aversion to nearly all foods, which she states began immediately after an abdominal wall hernia repair surgery.  She denies dysphagia.  She has no pain after eating.  She states that foods simply do not sound appetizing and when she does eat they taste different or not appealing.  She does not have typical reflux symptoms (such as retrosternal burning or regurgitation).  She denies a change in bowel habits.   She reports that her father developed an inability to taste foods in his 30s.    Past Medical History:  Past Medical History:   Diagnosis Date    Anxiety     Clotting disorder (H)     Hyperlipemia     Hypertension     PAD (peripheral artery disease) (H)     Thrombosis      Past Surgical  History:   Procedure Laterality Date    BREAST SURGERY      BYPASS GRAFT AORTOILIAC  2018    DAVINCI XI HERNIORRHAPHY VENTRAL N/A 3/9/2022    Procedure: ROBOT-ASSISTED REPAIR OF MULTIPLE MIDLINE VENTRAL HERNIAS, USING DA THERESA XI;  Surgeon: Lacey Munroe MD;  Location: [facility] OR    LAPAROSCOPIC CHOLECYSTECTOMY WITH CHOLANGIOGRAMS N/A 10/19/2014    Procedure: LAPAROSCOPIC CHOLECYSTECTOMY WITH CHOLANGIOGRAMS;  Surgeon: Seamus Holman MD;  Location: [facility] OR    VASCULAR SURGERY         Review of Systems: A comprehensive review of systems was negative except for items noted in HPI/Subjective.    Current Medications:  No current outpatient medications on file.       Allergies/Sensitivities:   Allergies   Allergen Reactions    Sunflower Oil Difficulty breathing    Lisinopril Cough          Social History:   Social History     Socioeconomic History    Marital status: Single     Spouse name: Not on file    Number of children: Not on file    Years of education: Not on file    Highest education level: Not on file   Occupational History    Not on file   Tobacco Use    Smoking status: Former    Smokeless tobacco: Never    Tobacco comments:     Vapes   Substance and Sexual Activity    Alcohol use: Yes    Drug use: Not Currently     Types: Marijuana    Sexual activity: Not on file   Other Topics Concern    Parent/sibling w/ CABG, MI or angioplasty before 65F 55M? Not Asked   Social History Narrative    Not on file     Social Determinants of Health     Financial Resource Strain: Not on file   Food Insecurity: Not on file   Transportation Needs: Not on file   Physical Activity: Not on file   Stress: Not on file   Social Connections: Not on file   Interpersonal Safety: Not on file   Housing Stability: Not on file     Family History:   Family History   Problem Relation Age of Onset    Hypertension Mother     Heart Disease Father     Hypertension Father        Physical Exam:  /52 (BP Location: Left arm)   Pulse 79   Temp  "97.4  F (36.3  C) (Oral)   Resp 20   Ht 1.651 m (5' 5\")   Wt 65.4 kg (144 lb 3.2 oz)   SpO2 93%   BMI 24.00 kg/m     General Appearance: Comfortable, laying in bed  Eyes: Normal  HEENT: Normal  Neck: Normal, no palpable lymph nodes  Respiratory: Normal  Cardiovascular: Normal  Gastrointestinal: Normal bowel sounds  Musculoskeletal: Normal  Lymphatic: Normal  Skin: Normal  Neurologic: Normal     Labs/Imaging:  Recent Labs   Lab Test 12/30/23  0706 12/29/23  1217 02/16/22  1225 02/16/22  0711 02/15/22  0701 02/14/22  1857 10/09/20  0759 10/08/20  0637 04/09/18  1135 07/21/16  0839 07/20/16  0925   WBC 5.1 7.1  --  4.5 7.1 7.8 5.1 6.9   < >  --  8.4   HGB 10.3* 12.9 12.0 11.7 12.3 13.1 11.8 12.2   < >  --  12.5   MCV 92 92  --  96 95 93 96 95   < >  --  94   * 153  --  189 194 226 157 174   < >  --  324   INR  --   --   --   --   --  0.94  --   --   --  2.42* 2.20*    < > = values in this interval not displayed.     No lab results found.    Invalid input(s): \"FERRITIN\"  Recent Labs   Lab Test 12/30/23  0706 12/29/23  1217 02/16/22  0711 02/15/22  0701 02/14/22  1857 10/10/20  0648 10/09/20  0759   POTASSIUM 3.0* 2.9* 3.8 4.1 3.9 4.0 3.7   CHLORIDE 106 91* 106 108 107 109 106   BUN 21.2 24.1* 5* 8 7 8 10     Recent Labs   Lab Test 12/30/23  0706 12/29/23  1424 12/29/23  1217 02/15/22  0701 10/07/20  1801 10/07/20  1719 04/09/18  1322 04/09/18  1135 07/20/16  0925   PROTEIN  --  30*  --   --  70*  --  Negative  --   --    ALBUMIN 2.6*  --  3.5 3.5  --  4.4  --  3.5 3.9   BILITOTAL 0.4  --  0.7 0.7  --  1.0  --  1.0 0.6   AST 61*  --  99* 56*  --  42  --  62* 34   ALT 66*  --  111* 55*  --  54*  --  105* 38   LIPASE  --   --  110*  --   --  164  --   --  256       "

## 2023-12-30 NOTE — PLAN OF CARE
For vital signs and complete assessments, please see documentation flowsheets.     [id number]  Pertinent assessments: Pt A&Ox4. SBA. On RA. Afebrile. VSS. On RA. Denies pain,nausea, & SOB. PIV infusing  IVF with K+ @100mls/hr.     Major Shift Events: Refused heparin injection. Pt wants to talk with doctor about order.    Treatment Plan: Symptom management. Monitor Labs. IVF.    Bedside Nurse: Martinez Wilder RN

## 2023-12-31 VITALS
DIASTOLIC BLOOD PRESSURE: 59 MMHG | RESPIRATION RATE: 16 BRPM | TEMPERATURE: 97.8 F | OXYGEN SATURATION: 98 % | HEART RATE: 76 BPM | HEIGHT: 65 IN | SYSTOLIC BLOOD PRESSURE: 131 MMHG | BODY MASS INDEX: 24.03 KG/M2 | WEIGHT: 144.2 LBS

## 2023-12-31 LAB
ANION GAP SERPL CALCULATED.3IONS-SCNC: 11 MMOL/L (ref 7–15)
BUN SERPL-MCNC: 13.9 MG/DL (ref 8–23)
CALCIUM SERPL-MCNC: 7.7 MG/DL (ref 8.8–10.2)
CHLORIDE SERPL-SCNC: 109 MMOL/L (ref 98–107)
CREAT SERPL-MCNC: 2.37 MG/DL (ref 0.51–0.95)
DEPRECATED HCO3 PLAS-SCNC: 17 MMOL/L (ref 22–29)
EGFRCR SERPLBLD CKD-EPI 2021: 22 ML/MIN/1.73M2
ERYTHROCYTE [DISTWIDTH] IN BLOOD BY AUTOMATED COUNT: 14 % (ref 10–15)
GLUCOSE SERPL-MCNC: 82 MG/DL (ref 70–99)
HCT VFR BLD AUTO: 31.5 % (ref 35–47)
HGB BLD-MCNC: 10.4 G/DL (ref 11.7–15.7)
MCH RBC QN AUTO: 30.9 PG (ref 26.5–33)
MCHC RBC AUTO-ENTMCNC: 33 G/DL (ref 31.5–36.5)
MCV RBC AUTO: 94 FL (ref 78–100)
PLATELET # BLD AUTO: 131 10E3/UL (ref 150–450)
POTASSIUM SERPL-SCNC: 3.4 MMOL/L (ref 3.4–5.3)
RBC # BLD AUTO: 3.37 10E6/UL (ref 3.8–5.2)
SODIUM SERPL-SCNC: 137 MMOL/L (ref 135–145)
WBC # BLD AUTO: 4.6 10E3/UL (ref 4–11)

## 2023-12-31 PROCEDURE — 85027 COMPLETE CBC AUTOMATED: CPT | Performed by: STUDENT IN AN ORGANIZED HEALTH CARE EDUCATION/TRAINING PROGRAM

## 2023-12-31 PROCEDURE — 36415 COLL VENOUS BLD VENIPUNCTURE: CPT | Performed by: STUDENT IN AN ORGANIZED HEALTH CARE EDUCATION/TRAINING PROGRAM

## 2023-12-31 PROCEDURE — 80048 BASIC METABOLIC PNL TOTAL CA: CPT | Performed by: STUDENT IN AN ORGANIZED HEALTH CARE EDUCATION/TRAINING PROGRAM

## 2023-12-31 PROCEDURE — 250N000013 HC RX MED GY IP 250 OP 250 PS 637: Performed by: HOSPITALIST

## 2023-12-31 PROCEDURE — 99239 HOSP IP/OBS DSCHRG MGMT >30: CPT | Performed by: STUDENT IN AN ORGANIZED HEALTH CARE EDUCATION/TRAINING PROGRAM

## 2023-12-31 RX ORDER — MIRTAZAPINE 15 MG/1
15 TABLET, FILM COATED ORAL AT BEDTIME
Status: DISCONTINUED | OUTPATIENT
Start: 2023-12-31 | End: 2023-12-31 | Stop reason: HOSPADM

## 2023-12-31 RX ADMIN — POTASSIUM CHLORIDE FOR ORAL SOLUTION 20 MEQ: 1.5 POWDER, FOR SOLUTION ORAL at 08:22

## 2023-12-31 RX ADMIN — FOLIC ACID 1 MG: 1 TABLET ORAL at 08:21

## 2023-12-31 RX ADMIN — PANTOPRAZOLE SODIUM 40 MG: 40 TABLET, DELAYED RELEASE ORAL at 08:21

## 2023-12-31 RX ADMIN — NICOTINE 1 PATCH: 14 PATCH, EXTENDED RELEASE TRANSDERMAL at 08:28

## 2023-12-31 ASSESSMENT — ACTIVITIES OF DAILY LIVING (ADL)
ADLS_ACUITY_SCORE: 22
ADLS_ACUITY_SCORE: 22
ADLS_ACUITY_SCORE: 24
ADLS_ACUITY_SCORE: 22

## 2023-12-31 NOTE — PLAN OF CARE
Pertinent assessments: A&Ox4. VSS on room air, afebrile. Denies pain. Refusing bed alarm, steady on feet and calls appropriately. Up SBA.     Major Shift Events: None    Treatment Plan: Symptom management. Monitor Labs. IVF.  Bedside Nurse: Jeny Lynch RN

## 2023-12-31 NOTE — PROGRESS NOTES
[facility]    Medicine Progress Note - Hospitalist Service    Date of Admission:  12/29/2023    Assessment & Plan   Isa Eller is a 63 year old female with history of PAD, bilateral iliac stenting then bilateral aortoiliac bypass grafting (2018), HTN, HLP, DVT, SBO related to ventral hernia, anxiety, CKD admitted on 12/29/2023 with symptoms of lightheadedness, fever, weakness.     Found to have acute on chronic renal failure, hypokalemia, transaminitis, hypotension.     She improved rapidly with IVF resuscitation.  Suspect that her presentation is related to poor p.o. intake in the setting of food aversion as well as ongoing blood pressure medication use.  GI was consulted who did not have anything to offer.  Psychiatry was consulted, awaiting recs.     Severe food aversion:  Patient reports food aversion following hernia repair surgery with mesh 2 years ago.  Since then she reports poor appetite, early satiety, food tasting terribly, nausea.  She reports quite poor p.o. intake for the past 1 to 2 years.  Suspect this is largely the reason for her presentation with acute renal failure on CKD, severe hypotension, lactic acidosis.  She was eval by GI who do not have any recommendations for further workup.    -Psychiatry consultation  -Continue MIVF  -Closely follow labs     Severe hypotension leading to presyncope  Hypokalemia  Hyponatremia  Hypocalcemia:  Suspect that these are all related to poor p.o. intake in the setting of the above.  These all improved with replacement and fluid resuscitation.  The patient is feeling significantly improved with IVF.    Normocytic anemia:  Hemoglobin 10 elevated 2 of hospitalization.  Was 12 on the day of presentation.  Patient denies any black stools.  She denies bloody stools or bleeding elsewhere.  Suspect that she has anemia at baseline but is now showing because she has adequate volume resuscitation.    -Recheck hemoglobin in the afternoom and AM      Transaminitis:  Suspect that her elevated liver function test on presentation is related to hypotension.  These were improving with IV fluid resuscitation normotension.  Consider recheck on follow-up with PCP.     Acute renal failure on CKD 3B:  Known CKD stage IIIb.  Presents with a creatinine of 3.8.  This is improving with IVF resuscitation.    -Continue IVF overnight  -Repeat BMP in AM     PAD s/p bilateral iliac stenting and bilateral aortic iliac bypass grafting: PTA ASA, rosuvastatin.     Hypertension/hyperlipidemia: PTA statin.  Hold PTA amlodipine given presentation of severe hypotension in setting of poor p.o. intake.           Diet: Combination Diet Regular Diet Adult  Snacks/Supplements Adult: Ensure Clear; With Meals  Diet    DVT Prophylaxis: Heparin SQ  Temple Catheter: Not present  Lines: None     Cardiac Monitoring: None  Code Status: Full Code      Clinically Significant Risk Factors        # Hypokalemia: Lowest K = 3 mmol/L in last 2 days, will replace as needed       # Hypoalbuminemia: Lowest albumin = 2.6 g/dL at 12/30/2023  7:06 AM, will monitor as appropriate   # Thrombocytopenia: Lowest platelets = 100 in last 2 days, will monitor for bleeding                     Disposition Plan      Expected Discharge Date: 12/31/2023                    Darius Moss DO  Hospitalist Service  [facility]  Securely message with [employer] (more info)  Text page via [employer] Paging/Directory   ______________________________________________________________________    Interval History   NAEO.  Patient feeling much improved with IV fluid resuscitation.  She has no acute complaints today.  She reports to me that she has had a significant food aversion over the past 1 to 2 years following hernia repair with mesh.  Presently, she is only eating 2 eggs per day plus or minus a piece of toast.  She also endorses drinking 3-4 beers per night to help with sleep.  Otherwise she mostly eats fruits.  We had  a long discussion with possible causes.  She has a loss for what may be causing this and how we can move forward.    Physical Exam   Vital Signs: Temp: 97.8  F (36.6  C) Temp src: Oral BP: 131/59 Pulse: 76   Resp: 16 SpO2: 98 % O2 Device: None (Room air)    Weight: 144 lbs 3.2 oz    General Appearance:     Awake and alert.  No apparent distress.  Sitting up in bed.  Respiratory: Lungs are clear to auscultation bilaterally productive breathing is normal on room air.  Cardiovascular: Regular rate and rhythm.  No murmurs or gallops.  No peripheral pitting edema.  Distal extremities are warm and well-perfused.  GI: Benign.  Soft.  No tenderness to palpation peer bowel sounds positive.  Skin: No rash or lesions exposed skin.  Other:   Anxious.    Medical Decision Making       50 MINUTES SPENT BY ME on the date of service doing chart review, history, exam, documentation & further activities per the note.      Data   ------------------------- PAST 24 HR DATA REVIEWED -----------------------------------------------    I have personally reviewed the following data over the past 24 hrs:    4.6  \   10.4 (L)   / 131 (L)     137 109 (H) 13.9 /  82   3.4 17 (L) 2.37 (H) \       Imaging results reviewed over the past 24 hrs:   No results found for this or any previous visit (from the past 24 hour(s)).

## 2023-12-31 NOTE — CONSULTS
"Psychiatry Initial Consultation    ID/HPI:  Patient is a single 62 yo female seen for evaluation at request of Dr. Stearns in this patient admitted with poor PO intake, lightheadedness.  Found to be anemic with elevated lft's and creatinine but with known CRI.  Continues to drink alcohol but little food intake chronically since 2021 when she went through a series of operations for Aortal-femoral bypass and then abdominal hernia repair.  Has been vaping nicotine since 2016 and thinks she is getting 'vape tongue'  Admits alcohol use daily to sleep and 'I've been drinking since I was a teenager\".  Denies thinking she is alcoholic.  Stated resentment of her PMD leaving practice, has failed to follow up with her dentist, has swollen gums, cheek and poor dentition and had been recommended to have possible extractions/denture.  States taste and smell of food causes severe aversion.  Does feel hungry and wants to eat 'badly\" and has insomnia due to hunger pain.  Weight is still reasonable for height.  Recent loss of sister, and patient handling the estate.  No falls, memory issues.  Has seen GI service, no other recommendations .  VSS, no evidence of withdrawal.      Past Psychiatric History:  Denies hospitalizations, suicide attempts, CD treatments, lauren or psychosis.  Situational depression but has h/o panic disorder and wants no change to klonipin.  No h/o of eating disorder. No LD or ADHD    PMH:  S/F gladys, aortoiliac bypass, HTN, Hyperlipidemia, CRI, Nicotine dependence.    Allergies:  Lisinopril    VSS  Temp 97.8, Pulse 76, /59    FH Depression in maternal relative    SH Retired, lives locally, still drives.  Has supportive friends, siblings.    MSE:  Alert, fully oriented. Pleasant, conversant, spontaneous.  Normal intelligence.  No evidence of memory disturbance.  Denies blackouts.  No tremor noted or neurological neglect.  Maintained appropriate attention.  Mildly defensive when challenged.  Mood described as " sad with recent loss, some stress.  Frustrated with her physical symptoms .  No s/I, SIB.  No body weight distortion.  Fair insight, some likely minimization of alcohol impact. No other impulsivity of judgment and agrees to abide by medical recommendations except for complete abstinence.      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   Needs to see Endodontist re likely ongoing dental infections that could be significantly contributing to her change in smell and taste and anorexia.    4.   Discontinue vaping.  Use nicotine patch   5.   Continue prn klonipin as requested but she was warned not to combine with alcohol  6.   Remeron 15mg at bedtime prn for sleep, anxiety and can improve digestive symptoms.    7.   Therapy would be helpful but is declining but agrees to work with a new PMD.     8.   Feeding tube option in future if significant weight loss, or medical instability discussed with patient    Please call prn until release home.

## 2023-12-31 NOTE — PLAN OF CARE
"Goal Outcome Evaluation:  Plan of Care Reviewed With: patient    PPertinent assessments: A&Ox4.  VSS. On RA. SBA. Afebrile. Denies pain,nausea, & SOB. PIV on R A infusing  NS+KCL @100mls/hr. Poor oral intake. Encouraged to eat/fluids. Large bruise on L Arm Stated \"Its from a fall at home.\" Refused Heparin Injection. MD notified. K+ is 3.0 this morning. Not on protocol but has Scheduled KCL BID. MD aware. Both GI & Psych consulted.     Major Shift Events: Refused heparin injection. MD notified. GI & Psych consulted     Treatment Plan: Symptom management. Monitor Labs. IVF.    "

## 2023-12-31 NOTE — PLAN OF CARE
Pt to D/C to home. Pt provided with d/c instructions, including new medications, when medications were last given, and when to take them again.  Pt also informed to f/u with PCP in 7 days for BPM and gastro, and mental health. Pt verbalized understanding of all d/c and f/u instructions.  All questions were answered at this time.  Copy of paperwork sent with pt.  No medications need to be picked up at this time. Friend to provide transport.  All personal belongings sent with pt.

## 2023-12-31 NOTE — DISCHARGE SUMMARY
[facility]  Hospitalist Discharge Summary      Date of Admission:  12/29/2023  Date of Discharge:  12/31/2023 12:31 PM  Discharging Provider: Darius Moss DO  Discharge Service: Hospitalist Service    Discharge Diagnoses   Isa Eller is a 63 year old female with history of PAD, bilateral iliac stenting then bilateral aortoiliac bypass grafting (2018), HTN, HLP, DVT, SBO related to ventral hernia, anxiety, CKD admitted on 12/29/2023 with symptoms of lightheadedness, fever, weakness.    Found to have acute on chronic renal failure, hypokalemia, transaminitis, hypotension.    She improved rapidly with IVF resuscitation.  Suspect that her presentation is related to poor p.o. intake in the setting of food aversion as well as ongoing blood pressure medication use.  GI was consulted who did not have anything to offer.  Psychiatry was consulted who recommend refraining from alcohol use, reschedule with PMD in community, seeing and test as her ongoing dental infections could be contributing to her change in smell and taste.    Severe food aversion:  Patient reports food aversion following hernia repair surgery with mesh 2 years ago.  Since then she reports poor appetite, early satiety, food tasting terribly, nausea.  She reports quite poor p.o. intake for the past 1 to 2 years.  Suspect this is largely the reason for her presentation with acute renal failure on CKD, severe hypotension, lactic acidosis.  She was eval by GI who do not have any recommendations for further workup.  She was also evaluated by psychiatry who recommend rescheduling with the PMD in the community, therapy.  Patient is aware that feeding tube would be an option in the future if she has significant weight loss, medical instability.  -Referral to psychiatry  -I encouraged good p.o. intake    Severe hypotension leading to presyncope  Hypokalemia  Hyponatremia  Hypocalcemia:  Suspect that these are all related to poor p.o.  intake in the setting of the above.  These all improved with replacement and fluid resuscitation.  The patient is feeling significantly improved prior to discharge.  -Encourage good p.o. intake    Transaminitis:  Suspect that her elevated liver function test on presentation is related to hypotension.  These were improving with IV fluid resuscitation normotension.  Consider recheck on follow-up with PCP.    Acute renal failure on CKD 3B:  Known CKD stage IIIb.  Presents with a creatinine of 3.8.  This is improving day by day prior to discharge.  Her creatinine on the day of discharge is 2.37.  Anticipate that this will continue to improve if the patient has good p.o. intake as an outpatient.  I did recommend a BMP within 1 week of discharge.    PAD s/p bilateral iliac stenting and bilateral aortic iliac bypass grafting: PTA ASA, rosuvastatin.    Hypertension/hyperlipidemia: PTA statin.  Hold PT amlodipine given presentation of severe hypotension in setting of poor p.o. intake.  Consider resumption in the future if patient has good p.o. intake and blood pressure trends upwards.      Clinically Significant Risk Factors          Follow-ups Needed After Discharge   Follow-up Appointments     Follow-up and recommended labs and tests       Follow up with primary care provider, Amena Stearns, within 7 days for   hospital follow- up.  The following labs/tests are recommended: BMP.        BP recheck, BMP     Unresulted Labs Ordered in the Past 30 Days of this Admission       Date and Time Order Name Status Description    12/29/2023 12:10 PM Blood Culture Peripheral Blood Preliminary     12/29/2023 12:10 PM Blood Culture Peripheral Blood Preliminary         These results will be followed up by PCP    Discharge Disposition   Discharged to home  Condition at discharge: Good    Consultations This Hospital Stay   PHARMACY TO DOSE [facility]  GASTROENTEROLOGY IP CONSULT  PSYCHIATRY IP CONSULT    Code Status   Full Code    Time Spent on  this Encounter   I, Darius Moss DO, personally saw the patient today and spent greater than 30 minutes discharging this patient.       Darius Moss DO  [place] [address] MEDICAL SURGICAL  [address]  [facility] [address]  Phone: [phone]  Fax: [phone]  ______________________________________________________________________    Physical Exam   Vital Signs: Temp: 97.8  F (36.6  C) Temp src: Oral BP: 131/59 Pulse: 76   Resp: 16 SpO2: 98 % O2 Device: None (Room air)    Weight: 144 lbs 3.2 oz  General Appearance: Awake and alert.  No apparent distress.  Sitting up in bed.  Respiratory: Lungs are clear to auscultation bilaterally productive breathing is normal on room air.  Cardiovascular: Regular rate and rhythm.  No murmurs or gallops.  No peripheral pitting edema.  Distal extremities are warm and well-perfused.  GI: Benign.  Soft.  No tenderness to palpation peer bowel sounds positive.  Skin: No rash or lesions exposed skin.  Other: Anxious.       Primary Care Physician   Amena Stearns    Discharge Orders      Basic metabolic panel     Adult Mental Health  Referral      Adult GI [facility] Referral - Consult Only      Reason for your hospital stay    You were hospitalized for dizziness.  You were found to have severe hypotension.  You were also found to have renal failure, low potassium levels, low sodium levels     Follow-up and recommended labs and tests     Follow up with primary care provider, Amena Stearns, within 7 days for hospital follow- up.  The following labs/tests are recommended: BMP.     Activity    Your activity upon discharge: activity as tolerated     Diet    Follow this diet upon discharge:       Snacks/Supplements Adult: Ensure Clear; With Meals      Combination Diet Regular Diet Adult       Significant Results and Procedures   Most Recent 3 CBC's:  Recent Labs   Lab Test 12/31/23  0653 12/30/23  1632 12/30/23  0706   WBC 4.6 4.3 5.1   HGB 10.4* 10.2*  10.3*   MCV 94 94 92   * 112* 100*     Most Recent 3 BMP's:  Recent Labs   Lab Test 12/31/23  0653 12/30/23  0706 12/29/23  1217    137 130*   POTASSIUM 3.4 3.0* 2.9*   CHLORIDE 109* 106 91*   CO2 17* 19* 21*   BUN 13.9 21.2 24.1*   CR 2.37* 2.90* 3.80*   ANIONGAP 11 12 18*   JOSE 7.7* 7.6* 8.9   GLC 82 79 129*       Discharge Medications   Current Discharge Medication List        CONTINUE these medications which have NOT CHANGED    Details   aspirin 81 MG EC tablet Take 81 mg by mouth every evening      clonazePAM (KLONOPIN) 1 MG tablet Take 1 mg by mouth 2 times daily as needed for anxiety       folic acid (FOLVITE) 1 MG tablet Take 1 mg by mouth daily      omeprazole (PRILOSEC) 40 MG capsule Take 1 capsule (40 mg) by mouth daily Take 30-60 minutes before a meal.  Qty: 90 capsule, Refills: 1    Associated Diagnoses: Gastroesophageal reflux disease, esophagitis presence not specified      potassium chloride ER (K-TAB) 20 MEQ CR tablet Take 20 mEq by mouth 2 times daily      rosuvastatin (CRESTOR) 40 MG tablet Take 40 mg by mouth every evening           STOP taking these medications       amLODIPine (NORVASC) 5 MG tablet Comments:   Reason for Stopping:         Metoprolol Succinate (TOPROL XL PO) Comments:   Reason for Stopping:             Allergies   Allergies   Allergen Reactions    Sunflower Oil Difficulty breathing    Lisinopril Cough

## 2024-01-02 ENCOUNTER — PATIENT OUTREACH (OUTPATIENT)
Dept: CARE COORDINATION | Facility: CLINIC | Age: 64
End: 2024-01-02
Payer: COMMERCIAL

## 2024-01-02 LAB
ATRIAL RATE - MUSE: 258 BPM
DIASTOLIC BLOOD PRESSURE - MUSE: NORMAL MMHG
INTERPRETATION ECG - MUSE: NORMAL
P AXIS - MUSE: 69 DEGREES
PR INTERVAL - MUSE: NORMAL MS
QRS DURATION - MUSE: 90 MS
QT - MUSE: 448 MS
QTC - MUSE: 462 MS
R AXIS - MUSE: 58 DEGREES
SYSTOLIC BLOOD PRESSURE - MUSE: NORMAL MMHG
T AXIS - MUSE: 78 DEGREES
VENTRICULAR RATE- MUSE: 64 BPM

## 2024-01-02 NOTE — PROGRESS NOTES
Clinic Care Coordination Contact  [facility]: Post-Discharge Note  SITUATION                                                      Admission:    Admission Date: 12/29/23   Reason for Admission: Acute kidney injury superimposed on CKD    Sepsis, due to unspecified organism, unspecified whether acute organ dysfunction present (H)  Discharge:   Discharge Date: 12/31/23  Discharge Diagnosis: Acute kidney injury superimposed on CKD    Sepsis, due to unspecified organism, unspecified whether acute organ dysfunction present (H)    BACKGROUND                                                      Per hospital discharge summary and inpatient provider notes:    Isa Eller is a 63 year old female with history of PAD, bilateral iliac stenting then bilateral aortoiliac bypass grafting (2018), HTN, HLP, DVT, SBO related to ventral hernia, anxiety, CKD admitted on 12/29/2023 with symptoms of lightheadedness, fever, weakness.     Found to have acute on chronic renal failure, hypokalemia, transaminitis, hypotension.    ASSESSMENT           Discharge Assessment  How are you doing now that you are home?: doing well  How are your symptoms? (Red Flag symptoms escalate to triage hotline per guidelines): Improved  Do you feel your condition is stable enough to be safe at home until your provider visit?: Yes  Does the patient have their discharge instructions? : Yes  Does the patient have questions regarding their discharge instructions? : No  Were you started on any new medications or were there changes to any of your previous medications? : Yes  Does the patient have all of their medications?: Yes  Do you have questions regarding any of your medications? : No  Do you have all of your needed medical supplies or equipment (DME)?  (i.e. oxygen tank, CPAP, cane, etc.): Yes  Discharge follow-up appointment scheduled within 14 calendar days? : Yes  Discharge Follow Up Appointment Date: 01/08/24  Discharge Follow Up Appointment  Scheduled with?: Primary Care Provider    Post-op ([facility] Only)  If the patient had a surgery or procedure, do they have any questions for a nurse?: No         PLAN                                                      Outpatient Plan:      No future appointments.    Follow-ups Needed After Discharge   Follow-up Appointments     Follow-up and recommended labs and tests       Follow up with primary care provider, Amena Stearns, within 7 days for   hospital follow- up.  The following labs/tests are recommended: BMP.      For any urgent concerns, please contact our 24 hour nurse triage line: [phone]     XOCHITL Reyes  [phone]  [facility]

## 2024-01-03 LAB
BACTERIA BLD CULT: NO GROWTH
BACTERIA BLD CULT: NO GROWTH

## 2025-01-11 ENCOUNTER — HEALTH MAINTENANCE LETTER (OUTPATIENT)
Age: 65
End: 2025-01-11

## 2025-04-05 ENCOUNTER — HEALTH MAINTENANCE LETTER (OUTPATIENT)
Age: 65
End: 2025-04-05

## (undated) DEVICE — LINEN ORTHO ACL PACK 5447

## (undated) DEVICE — SU STRATAFIX PDS PLUS 0 CT-2 9" SXPP1A446

## (undated) DEVICE — LINEN DRAPE 54X72" 5467

## (undated) DEVICE — SU VICRYL 4-0 PS-2 18" UND J496H

## (undated) DEVICE — LIGHT HANDLE X2

## (undated) DEVICE — DAVINCI XI SEAL UNIVERSAL 5-8MM 470361

## (undated) DEVICE — DRSG STERI STRIP 1/2X4" R1547

## (undated) DEVICE — ESU CORD MONOPOLAR 10'  E0510

## (undated) DEVICE — SU STRATAFIX PDS PLUS 0 CT-2 18" SXPP1A407

## (undated) DEVICE — SU WND CLOSURE VLOC 180 ABS 3-0 6" V-20 VLOCL0604

## (undated) DEVICE — DAVINCI XI DRAPE ARM 470015

## (undated) DEVICE — SOL WATER IRRIG 1000ML BOTTLE 2F7114

## (undated) DEVICE — DAVINCI HOT SHEARS TIP COVER  400180

## (undated) DEVICE — GLOVE PROTEXIS W/NEU-THERA 6.5  2D73TE65

## (undated) DEVICE — LUBRICANT INST ELECTROLUBE EL101

## (undated) DEVICE — CATH TRAY FOLEY SURESTEP 16FR DRAIN BAG STATOCK A899916

## (undated) DEVICE — PACK MINOR CUSTOM RIDGES SBA32RMRMA

## (undated) DEVICE — ENDO TROCAR FIRST ENTRY KII FIOS Z-THRD 05X100MM CTF03

## (undated) DEVICE — SYSTEM CLEARIFY VISUALIZATION 21-345

## (undated) DEVICE — PREP CHLORAPREP 26ML TINTED HI-LITE ORANGE 930815

## (undated) DEVICE — ESU GROUND PAD ADULT W/CORD E7507

## (undated) DEVICE — SU WND CLOSURE VLOC 180 ABS 3-0 12" V-20 VLOCL0614

## (undated) DEVICE — DRAPE LAP W/ARMBOARD 29410

## (undated) DEVICE — DAVINCI XI OBTURATOR BLADELESS 8MM 470359

## (undated) DEVICE — DAVINCI XI ESU FORCE BIPOLAR 8MM 471405

## (undated) DEVICE — NDL INSUFFLATION 13GA 120MM C2201

## (undated) DEVICE — GLOVE PROTEXIS BLUE W/NEU-THERA 6.5  2D73EB65

## (undated) DEVICE — DAVINCI XI DRAPE COLUMN 470341

## (undated) RX ORDER — PROPOFOL 10 MG/ML
INJECTION, EMULSION INTRAVENOUS
Status: DISPENSED
Start: 2022-03-09

## (undated) RX ORDER — KETOROLAC TROMETHAMINE 30 MG/ML
INJECTION, SOLUTION INTRAMUSCULAR; INTRAVENOUS
Status: DISPENSED
Start: 2022-03-09

## (undated) RX ORDER — BUPIVACAINE HYDROCHLORIDE 5 MG/ML
INJECTION, SOLUTION EPIDURAL; INTRACAUDAL
Status: DISPENSED
Start: 2022-03-09

## (undated) RX ORDER — LIDOCAINE HYDROCHLORIDE 10 MG/ML
INJECTION, SOLUTION EPIDURAL; INFILTRATION; INTRACAUDAL; PERINEURAL
Status: DISPENSED
Start: 2022-03-09

## (undated) RX ORDER — ONDANSETRON 2 MG/ML
INJECTION INTRAMUSCULAR; INTRAVENOUS
Status: DISPENSED
Start: 2022-03-09

## (undated) RX ORDER — GLYCOPYRROLATE 0.2 MG/ML
INJECTION INTRAMUSCULAR; INTRAVENOUS
Status: DISPENSED
Start: 2022-03-09

## (undated) RX ORDER — FENTANYL CITRATE 50 UG/ML
INJECTION, SOLUTION INTRAMUSCULAR; INTRAVENOUS
Status: DISPENSED
Start: 2022-03-09

## (undated) RX ORDER — CEFAZOLIN SODIUM/WATER 2 G/20 ML
SYRINGE (ML) INTRAVENOUS
Status: DISPENSED
Start: 2022-03-09

## (undated) RX ORDER — DEXAMETHASONE SODIUM PHOSPHATE 4 MG/ML
INJECTION, SOLUTION INTRA-ARTICULAR; INTRALESIONAL; INTRAMUSCULAR; INTRAVENOUS; SOFT TISSUE
Status: DISPENSED
Start: 2022-03-09

## (undated) RX ORDER — OXYCODONE HYDROCHLORIDE 5 MG/1
TABLET ORAL
Status: DISPENSED
Start: 2022-03-09

## (undated) RX ORDER — LABETALOL 20 MG/4 ML (5 MG/ML) INTRAVENOUS SYRINGE
Status: DISPENSED
Start: 2022-03-09